# Patient Record
Sex: FEMALE | Race: WHITE | Employment: FULL TIME | URBAN - METROPOLITAN AREA
[De-identification: names, ages, dates, MRNs, and addresses within clinical notes are randomized per-mention and may not be internally consistent; named-entity substitution may affect disease eponyms.]

---

## 2017-02-02 ENCOUNTER — TELEPHONE (OUTPATIENT)
Dept: NURSING | Facility: CLINIC | Age: 42
End: 2017-02-02

## 2017-02-02 DIAGNOSIS — B37.9 YEAST INFECTION: Primary | ICD-10-CM

## 2017-02-02 RX ORDER — FLUCONAZOLE 150 MG/1
150 TABLET ORAL ONCE
Qty: 1 TABLET | Refills: 0 | Status: CANCELLED | OUTPATIENT
Start: 2017-02-02 | End: 2017-02-02

## 2017-02-02 NOTE — TELEPHONE ENCOUNTER
LM with information from CE's note below. Informed pt to call back to schedule appt or if has any further questions.

## 2017-02-02 NOTE — TELEPHONE ENCOUNTER
If monistat didn't help at all, chances are it not a yeast infection. It could be BV, so I do recommend she have a visit to check wet prep prior to Rx.     TREY Lin, PARonnieC

## 2017-02-02 NOTE — TELEPHONE ENCOUNTER
10/10/16 Annual. Pt has yeast infection. Symptoms include itchy and a little bit of discharge. Pt has had yeast infection for a couple of days,  did try monistat which did not help at all. Ing to [facility]EN[facility] to send diflucan RX.

## 2017-02-03 ENCOUNTER — OFFICE VISIT (OUTPATIENT)
Dept: OBGYN | Facility: CLINIC | Age: 42
End: 2017-02-03
Payer: COMMERCIAL

## 2017-02-03 VITALS
WEIGHT: 129 LBS | HEART RATE: 80 BPM | BODY MASS INDEX: 20.73 KG/M2 | HEIGHT: 66 IN | SYSTOLIC BLOOD PRESSURE: 114 MMHG | DIASTOLIC BLOOD PRESSURE: 76 MMHG

## 2017-02-03 DIAGNOSIS — N76.0 ACUTE VAGINITIS: Primary | ICD-10-CM

## 2017-02-03 LAB
MICRO REPORT STATUS: ABNORMAL
SPECIMEN SOURCE: ABNORMAL
WET PREP SPEC: ABNORMAL

## 2017-02-03 PROCEDURE — 87210 SMEAR WET MOUNT SALINE/INK: CPT | Performed by: PHYSICIAN ASSISTANT

## 2017-02-03 PROCEDURE — 99213 OFFICE O/P EST LOW 20 MIN: CPT | Performed by: PHYSICIAN ASSISTANT

## 2017-02-03 RX ORDER — FLUCONAZOLE 150 MG/1
150 TABLET ORAL ONCE
Qty: 2 TABLET | Refills: 0 | Status: SHIPPED | OUTPATIENT
Start: 2017-02-03 | End: 2017-02-03

## 2017-02-03 NOTE — PROGRESS NOTES
"    SUBJECTIVE:                                                   Anusha Robins is a 41 year old female who presents to clinic today for the following health issue(s):  Patient presents with:  Vaginal Problem: Here for possible yeast infection, itching on/off for couple months, tried Monistat last dose over last weekend  Amenorrhea: has not received period, stopped taking OCP 3 weeks ago       Additional information: has not restarted OCP because she has not received her period yet    HPI:  Vaginal itching off and on x 2 months. Initially took monistat OTC and helped a little, then symptoms returned and took the one day, no help at all that time. Mostly itching that feels like it is \"every where down there\". No discharge, odor. Is having some pelvic cramping. No fever, chills dysuria.     Did not have a period over placebo week this month, so has not started a new pill pack yet. Took a home pregnancy test and it was negative earlier this week. She is now 3 weeks without taking ocps.     Typically she does have every other month a very light period, almost spotting.     Patient's last menstrual period was 2017..   Patient is sexually active, .  Using oral contraceptives for contraception.    reports that she has quit smoking. She does not have any smokeless tobacco history on file.    STD testing offered?  Declined    Health maintenance updated:  yes    Today's PHQ-2 Score:   PHQ-2 (  [employer]) 2016   Q1: Little interest or pleasure in doing things 0   Q2: Feeling down, depressed or hopeless 0   PHQ-2 Score 0     Today's PHQ-9 Score:   PHQ-9 SCORE 10/10/2016   Total Score 3     Today's CASSANDRA-7 Score:   CASSANDRA-7 SCORE 10/10/2016   Total Score 4       Problem list and histories reviewed & adjusted, as indicated.  Additional history: as documented.    Patient Active Problem List   Diagnosis     Hypothyroidism, unspecified type     Past Surgical History   Procedure Laterality Date     No history of surgery  " "      Social History   Substance Use Topics     Smoking status: Former Smoker     Smokeless tobacco: Not on file     Alcohol Use: 0.0 oz/week     0 Standard drinks or equivalent per week      Problem (# of Occurrences) Relation (Name,Age of Onset)    DIABETES (2) Maternal Grandmother            Current Outpatient Prescriptions   Medication Sig     fluconazole (DIFLUCAN) 150 MG tablet Take 1 tablet (150 mg) by mouth once for 1 dose Repeat dose if needed after 4 days.     norethindrone-ethinyl estradiol (GILDESS 1.5/30) 1.5-30 MG-MCG per tablet Take 1 tablet by mouth daily     levothyroxine (SYNTHROID, LEVOTHROID) 137 MCG tablet Take 1 tablet (137 mcg) by mouth daily     Esomeprazole Magnesium (NEXIUM PO)      Probiotic Product (PROBIOTIC PO)      No current facility-administered medications for this visit.     Allergies   Allergen Reactions     Sulfa Drugs        ROS:  12 point review of systems negative other than symptoms noted below.  Constitutional: Fatigue  Genitourinary: No Periods, Vaginal Discharge and Vaginal Itching    OBJECTIVE:     /76 mmHg  Pulse 80  Ht 5' 6\" (1.676 m)  Wt 129 lb (58.514 kg)  BMI 20.83 kg/m2  LMP 01/01/2017  Breastfeeding? No  Body mass index is 20.83 kg/(m^2).    Exam:  Constitutional:  Appearance: Well nourished, well developed alert, in no acute distress  Neurologic/Psychiatric:  Mental Status:  Oriented X3   Pelvic Exam:  External Genitalia:     Normal appearance for age, no discharge present, no tenderness present, no inflammatory lesions present, color normal  Vagina:     Normal vaginal vault without central or paravaginal defects, no discharge present, no inflammatory lesions present, no masses present  Bladder:     Nontender to palpation  Urethra:   Urethral Body:  Urethra palpation normal, urethra structural support normal   Urethral Meatus:  No erythema or lesions present  Cervix:     Appearance healthy, no lesions present, nontender to palpation, no bleeding " present  Uterus:     Nontender to palpation, no masses present, position anteflexed, mobility: normal  Adnexa:     No adnexal tenderness present, no adnexal masses present  Perineum:     Perineum within normal limits, no evidence of trauma, no rashes or skin lesions present  Anus:     Anus within normal limits, no hemorrhoids present      In-Clinic Test Results:  Results for orders placed or performed in visit on 02/03/17 (from the past 24 hour(s))   Wet  Prep   Result Value Ref Range    Specimen Description Vagina     Wet Prep (A)      No Trichomonas seen  No clue cells seen  Yeast seen      Micro Report Status Pending        ASSESSMENT/PLAN:                                                        ICD-10-CM    1. Acute vaginitis N76.0 Wet  Prep     fluconazole (DIFLUCAN) 150 MG tablet         Wet prep positive for yeast infection, recommend treatment with diflucan x 2 if no improvement after 4 days of first tablet. Reviewed common etiologies, treatment and prevention of vaginitis.    Recommend she start new pack of ocps. Discussed that it can be normal to not have a period when on ocps for a long time. I recommend she stick with the pill pack if this happens again in the future. She agrees to plan (Home UPT was negative).       Saira Lucero PA-C  [facility] [place]

## 2017-02-03 NOTE — MR AVS SNAPSHOT
"              After Visit Summary   2/3/2017    Anusha Robins    MRN: [id number]           Patient Information     Date Of Birth          1975        Visit Information        Provider Department      2/3/2017 2:50 PM Saira Lucero PA-C [facility]        Today's Diagnoses     Acute vaginitis    -  1        Follow-ups after your visit        Follow-up notes from your care team     Return if symptoms worsen or fail to improve.      Who to contact     If you have questions or need follow up information about today's clinic visit or your schedule please contact [facility] directly at [phone].  Normal or non-critical lab and imaging results will be communicated to you by MyChart, letter or phone within 4 business days after the clinic has received the results. If you do not hear from us within 7 days, please contact the clinic through MyChart or phone. If you have a critical or abnormal lab result, we will notify you by phone as soon as possible.  Submit refill requests through Symcat or call your pharmacy and they will forward the refill request to us. Please allow 3 business days for your refill to be completed.          Additional Information About Your Visit        MyChart Information     Symcat lets you send messages to your doctor, view your test results, renew your prescriptions, schedule appointments and more. To sign up, go to [email or web address] . Click on \"Log in\" on the left side of the screen, which will take you to the Welcome page. Then click on \"Sign up Now\" on the right side of the page.     You will be asked to enter the access code listed below, as well as some personal information. Please follow the directions to create your username and password.     Your access code is: [email or web address]  Expires: 2017  3:54 PM     Your access code will  in 90 days. If you need help or a new code, please call your [place] clinic or " "[phone].        Care EveryWhere ID     This is your Care EveryWhere ID. This could be used by other organizations to access your [place] medical records  [id number]        Your Vitals Were     Pulse Height BMI (Body Mass Index) Last Period Breastfeeding?       80 5' 6\" (1.676 m) 20.83 kg/m2 01/01/2017 No        Blood Pressure from Last 3 Encounters:   02/03/17 114/76   10/10/16 118/72   08/06/15 130/70    Weight from Last 3 Encounters:   02/03/17 129 lb (58.514 kg)   10/10/16 132 lb (59.875 kg)   08/06/15 130 lb (58.968 kg)              We Performed the Following     Wet  Prep          Today's Medication Changes          These changes are accurate as of: 2/3/17  3:54 PM.  If you have any questions, ask your nurse or doctor.               Start taking these medicines.        Dose/Directions    fluconazole 150 MG tablet   Commonly known as:  DIFLUCAN   Used for:  Acute vaginitis   Started by:  Saira Lucero PA-C        Dose:  150 mg   Take 1 tablet (150 mg) by mouth once for 1 dose Repeat dose if needed after 4 days.   Quantity:  2 tablet   Refills:  0            Where to get your medicines      These medications were sent to [place] [address] [place] [address] [address]     Phone:  [phone]    - fluconazole 150 MG tablet             Primary Care Provider    None Specified       No primary provider on file.        Thank you!     Thank you for choosing [facility] [place]  for your care. Our goal is always to provide you with excellent care. Hearing back from our patients is one way we can continue to improve our services. Please take a few minutes to complete the written survey that you may receive in the mail after your visit with us. Thank you!             Your Updated Medication List - Protect others around you: Learn how to safely use, store and throw away your medicines at www.disposemymeds.org.          This list is accurate as " of: 2/3/17  3:54 PM.  Always use your most recent med list.                   Brand Name Dispense Instructions for use    fluconazole 150 MG tablet    DIFLUCAN    2 tablet    Take 1 tablet (150 mg) by mouth once for 1 dose Repeat dose if needed after 4 days.       levothyroxine 137 MCG tablet    SYNTHROID/LEVOTHROID    90 tablet    Take 1 tablet (137 mcg) by mouth daily       NEXIUM PO          norethindrone-ethinyl estradiol 1.5-30 MG-MCG per tablet    GILDESS 1.5/30    84 tablet    Take 1 tablet by mouth daily       PROBIOTIC PO

## 2017-08-17 ENCOUNTER — OFFICE VISIT (OUTPATIENT)
Dept: OBGYN | Facility: CLINIC | Age: 42
End: 2017-08-17
Payer: COMMERCIAL

## 2017-08-17 VITALS
BODY MASS INDEX: 21.38 KG/M2 | WEIGHT: 133 LBS | DIASTOLIC BLOOD PRESSURE: 78 MMHG | SYSTOLIC BLOOD PRESSURE: 102 MMHG | HEIGHT: 66 IN

## 2017-08-17 DIAGNOSIS — R30.0 DYSURIA: ICD-10-CM

## 2017-08-17 DIAGNOSIS — N30.00 ACUTE CYSTITIS WITHOUT HEMATURIA: Primary | ICD-10-CM

## 2017-08-17 LAB

## 2017-08-17 PROCEDURE — 99213 OFFICE O/P EST LOW 20 MIN: CPT | Performed by: OBSTETRICS & GYNECOLOGY

## 2017-08-17 PROCEDURE — 81001 URINALYSIS AUTO W/SCOPE: CPT | Performed by: OBSTETRICS & GYNECOLOGY

## 2017-08-17 RX ORDER — PHENAZOPYRIDINE HYDROCHLORIDE 200 MG/1
200 TABLET, FILM COATED ORAL 3 TIMES DAILY PRN
Qty: 6 TABLET | Refills: 0 | Status: SHIPPED | OUTPATIENT
Start: 2017-08-17 | End: 2017-08-19

## 2017-08-17 RX ORDER — NITROFURANTOIN 25; 75 MG/1; MG/1
100 CAPSULE ORAL 2 TIMES DAILY
Qty: 14 CAPSULE | Refills: 0 | Status: SHIPPED | OUTPATIENT
Start: 2017-08-17 | End: 2017-11-15

## 2017-08-17 NOTE — MR AVS SNAPSHOT
"              After Visit Summary   2017    Anusha Robins    MRN: [id number]           Patient Information     Date Of Birth          1975        Visit Information        Provider Department      2017 2:20 PM Arlin He,  [facility]        Today's Diagnoses     UTI (urinary tract infection)    -  1       Follow-ups after your visit        Who to contact     If you have questions or need follow up information about today's clinic visit or your schedule please contact [facility] directly at [phone].  Normal or non-critical lab and imaging results will be communicated to you by Patsnaphart, letter or phone within 4 business days after the clinic has received the results. If you do not hear from us within 7 days, please contact the clinic through ZIIBRAt or phone. If you have a critical or abnormal lab result, we will notify you by phone as soon as possible.  Submit refill requests through WebThriftStore or call your pharmacy and they will forward the refill request to us. Please allow 3 business days for your refill to be completed.          Additional Information About Your Visit        MyChart Information     WebThriftStore lets you send messages to your doctor, view your test results, renew your prescriptions, schedule appointments and more. To sign up, go to [email or web address] . Click on \"Log in\" on the left side of the screen, which will take you to the Welcome page. Then click on \"Sign up Now\" on the right side of the page.     You will be asked to enter the access code listed below, as well as some personal information. Please follow the directions to create your username and password.     Your access code is: [email or web address]  Expires: 11/15/2017  3:14 PM     Your access code will  in 90 days. If you need help or a new code, please call your [place] clinic or [phone].        Care EveryWhere ID     This is your Care EveryWhere ID. This could " "be used by other organizations to access your [place] medical records  [id number]        Your Vitals Were     Height Last Period Breastfeeding? BMI (Body Mass Index)          5' 6\" (1.676 m) 08/14/2017 (Exact Date) No 21.47 kg/m2         Blood Pressure from Last 3 Encounters:   08/17/17 102/78   02/03/17 114/76   10/10/16 118/72    Weight from Last 3 Encounters:   08/17/17 133 lb (60.3 kg)   02/03/17 129 lb (58.5 kg)   10/10/16 132 lb (59.9 kg)              We Performed the Following     UA with Microscopic        Primary Care Provider    None Specified       No primary provider on file.        Equal Access to Services     MAIRA GONZALES : Mao Blake, alexsandra laurent, seth gaviria, jonathon perez. So [facility] [phone].    ATENCIÓN: Si habla español, tiene a kelly disposición servicios gratuitos de asistencia lingüística. Llame al [phone].    We comply with applicable federal civil rights laws and [place] laws. We do not discriminate on the basis of race, color, national origin, age, disability sex, sexual orientation or gender identity.            Thank you!     Thank you for choosing [facility] [place]  for your care. Our goal is always to provide you with excellent care. Hearing back from our patients is one way we can continue to improve our services. Please take a few minutes to complete the written survey that you may receive in the mail after your visit with us. Thank you!             Your Updated Medication List - Protect others around you: Learn how to safely use, store and throw away your medicines at www.disposemymeds.org.          This list is accurate as of: 8/17/17  3:14 PM.  Always use your most recent med list.                   Brand Name Dispense Instructions for use Diagnosis    levothyroxine 137 MCG tablet    SYNTHROID/LEVOTHROID    90 tablet    Take 1 tablet (137 mcg) by mouth daily    Hypothyroidism, unspecified type    "    NEXIUM PO           norethindrone-ethinyl estradiol 1.5-30 MG-MCG per tablet    GILDESS 1.5/30    84 tablet    Take 1 tablet by mouth daily    Uses birth control       PROBIOTIC PO

## 2017-08-17 NOTE — PROGRESS NOTES
"    SUBJECTIVE:                                                   Anusha Robins is a 41 year old female who presents to clinic today for the following health issue(s):  Patient presents with:  UTI: On going painful urination and frequency for a couple days, patient states she cannot \"keep anything in\"       HPI:  Had HA couple days ago at the end of a work day. Started drinking a lot of water, normally drinks a lot anyway. Next day not feeling much better, but started having urinary frequency. No pain at that point. Then last night, feels some ttp in low back and urgency and frequency.   No fevers, maybe chills. No n/v. Poor appetite.       Patient's last menstrual period was 2017 (exact date)..   Patient is sexually active, .  Using oral contraceptives for contraception.    reports that she has quit smoking. She does not have any smokeless tobacco history on file.      STD testing offered?  Declined    Health maintenance updated:  yes    Today's PHQ-2 Score:   PHQ-2 (  [employer]) 2016   Q1: Little interest or pleasure in doing things 0   Q2: Feeling down, depressed or hopeless 0   PHQ-2 Score 0     Today's PHQ-9 Score:   PHQ-9 SCORE 10/10/2016   Total Score 3     Today's CASSANDRA-7 Score:   CASSANDRA-7 SCORE 10/10/2016   Total Score 4       Problem list and histories reviewed & adjusted, as indicated.  Additional history: as documented.    Patient Active Problem List   Diagnosis     Hypothyroidism, unspecified type     Past Surgical History:   Procedure Laterality Date     NO HISTORY OF SURGERY        Social History   Substance Use Topics     Smoking status: Former Smoker     Smokeless tobacco: Not on file     Alcohol use 0.0 oz/week     0 Standard drinks or equivalent per week      Problem (# of Occurrences) Relation (Name,Age of Onset)    DIABETES (2) Maternal Grandmother            Current Outpatient Prescriptions   Medication Sig     nitroFURantoin, macrocrystal-monohydrate, (MACROBID) 100 MG capsule " "Take 1 capsule (100 mg) by mouth 2 times daily     phenazopyridine (PYRIDIUM) 200 MG tablet Take 1 tablet (200 mg) by mouth 3 times daily as needed for irritation Expect your urine to appear bright orange     norethindrone-ethinyl estradiol (GILDESS 1.5/30) 1.5-30 MG-MCG per tablet Take 1 tablet by mouth daily     levothyroxine (SYNTHROID, LEVOTHROID) 137 MCG tablet Take 1 tablet (137 mcg) by mouth daily     Esomeprazole Magnesium (NEXIUM PO)      Probiotic Product (PROBIOTIC PO)      No current facility-administered medications for this visit.      Allergies   Allergen Reactions     Sulfa Drugs        ROS:  12 point review of systems negative other than symptoms noted below.  Gastrointestinal: Back Pain  Genitourinary: Frequency, Night Sweats, Painful Urination and Urgency    OBJECTIVE:     /78  Ht 5' 6\" (1.676 m)  Wt 133 lb (60.3 kg)  LMP 08/14/2017 (Exact Date)  Breastfeeding? No  BMI 21.47 kg/m2  Body mass index is 21.47 kg/(m^2).    Exam:  Constitutional:  Appearance: Well nourished, well developed alert, in no acute distress  Neurologic/Psychiatric:  Mental Status:  Oriented X3      In-Clinic Test Results:  Results for orders placed or performed in visit on 08/17/17 (from the past 24 hour(s))   UA with Microscopic   Result Value Ref Range    Color Urine Yellow     Appearance Urine Clear     Glucose Urine Negative NEG^Negative mg/dL    Bilirubin Urine Negative NEG^Negative    Ketones Urine Negative NEG^Negative mg/dL    Specific Gravity Urine 1.010 1.003 - 1.035    pH Urine 7.0 5.0 - 7.0 pH    Protein Albumin Urine Negative NEG^Negative mg/dL    Urobilinogen Urine 0.2 0.2 - 1.0 EU/dL    Nitrite Urine Negative NEG^Negative    Blood Urine Trace (A) NEG^Negative    Leukocyte Esterase Urine 1+ (A) NEG^Negative    Source Midstream Urine     WBC Urine 2-5 (A) OTO2^O - 2 /HPF    RBC Urine O - 2 OTO2^O - 2 /HPF    Squamous Epithelial /LPF Urine Few FEW^Few /LPF    Bacteria Urine Few (A) NEG^Negative /HPF "       ASSESSMENT/PLAN:                                                        ICD-10-CM    1. Dysuria R30.0 nitroFURantoin, macrocrystal-monohydrate, (MACROBID) 100 MG capsule     phenazopyridine (PYRIDIUM) 200 MG tablet   2. Acute cystitis without hematuria N30.00          -Reviewed UA results. Based on this and history, will empirically tx for UTI with macrobid, pain control with pyridium and NSAIDs prn. If symptoms worsening, new symptoms such as fever develop or not improving, call to clinic, even if weekend.    Arlin Dotson Masters, [facility]

## 2017-10-06 DIAGNOSIS — E03.9 HYPOTHYROIDISM, UNSPECIFIED TYPE: ICD-10-CM

## 2017-10-06 RX ORDER — LEVOTHYROXINE SODIUM 137 UG/1
TABLET ORAL
Qty: 30 TABLET | Refills: 0 | Status: SHIPPED | OUTPATIENT
Start: 2017-10-06 | End: 2017-11-10

## 2017-10-06 NOTE — TELEPHONE ENCOUNTER
Levothyroxine 137mcg      Last Written Prescription Date:  10/10/16  Last Fill Quantity: 90,   # refills: 3  Last Office Visit with [facility] primary care provider:  10/10/16  Future Office visit: none    Per [facility] protocol will send one month supply only- Pt is due for annual exam

## 2017-11-07 ENCOUNTER — HOSPITAL ENCOUNTER (OUTPATIENT)
Dept: MAMMOGRAPHY | Facility: CLINIC | Age: 42
Discharge: HOME | End: 2017-11-07
Attending: PHYSICIAN ASSISTANT | Admitting: PHYSICIAN ASSISTANT
Payer: COMMERCIAL

## 2017-11-07 DIAGNOSIS — Z12.31 VISIT FOR SCREENING MAMMOGRAM: ICD-10-CM

## 2017-11-07 PROCEDURE — G0202 SCR MAMMO BI INCL CAD: HCPCS

## 2017-11-09 DIAGNOSIS — E03.9 HYPOTHYROIDISM, UNSPECIFIED TYPE: ICD-10-CM

## 2017-11-09 RX ORDER — LEVOTHYROXINE SODIUM 137 UG/1
TABLET ORAL
Qty: 30 TABLET | Refills: 0 | OUTPATIENT
Start: 2017-11-09

## 2017-11-09 NOTE — TELEPHONE ENCOUNTER
levothyroxine (SYNTHROID/LEVOTHROID) 137 MCG tablet      Last Written Prescription Date:  10/6/17  Last Fill Quantity: 30,   # refills: 0  Last Office Visit with [facility] [place] primary care provider:  10/10/16  Future Office visit: none    Routing refill request to provider for review/approval because:  Refill denied. Pt overdue for annual and labs.

## 2017-11-10 DIAGNOSIS — E03.9 HYPOTHYROIDISM, UNSPECIFIED TYPE: ICD-10-CM

## 2017-11-10 RX ORDER — LEVOTHYROXINE SODIUM 137 UG/1
TABLET ORAL
Qty: 90 TABLET | Refills: 0 | Status: SHIPPED | OUTPATIENT
Start: 2017-11-10 | End: 2017-11-15

## 2017-11-10 NOTE — TELEPHONE ENCOUNTER
Levothyroxine 137mcg      Last Written Prescription Date:  10/6/17  Last Fill Quantity: 30,   # refills: 0  Last Office Visit with [facility] primary care provider:  10/10/16  Future Office visit: 11/15/17    Pt due for annual, appt scheduled,3 month supply sent for insurance purposes.

## 2017-11-15 ENCOUNTER — OFFICE VISIT (OUTPATIENT)
Dept: OBGYN | Facility: CLINIC | Age: 42
End: 2017-11-15
Payer: COMMERCIAL

## 2017-11-15 VITALS
WEIGHT: 133.8 LBS | BODY MASS INDEX: 21.5 KG/M2 | DIASTOLIC BLOOD PRESSURE: 70 MMHG | HEART RATE: 84 BPM | HEIGHT: 66 IN | SYSTOLIC BLOOD PRESSURE: 100 MMHG

## 2017-11-15 DIAGNOSIS — Z13.6 SCREENING FOR ISCHEMIC HEART DISEASE: ICD-10-CM

## 2017-11-15 DIAGNOSIS — E03.9 HYPOTHYROIDISM, UNSPECIFIED TYPE: ICD-10-CM

## 2017-11-15 DIAGNOSIS — Z78.9 USES BIRTH CONTROL: ICD-10-CM

## 2017-11-15 DIAGNOSIS — Z01.419 ENCOUNTER FOR GYNECOLOGICAL EXAMINATION WITHOUT ABNORMAL FINDING: Primary | ICD-10-CM

## 2017-11-15 LAB
CHOLEST SERPL-MCNC: 146 MG/DL
HDLC SERPL-MCNC: 97 MG/DL
LDLC SERPL CALC-MCNC: 39 MG/DL
NONHDLC SERPL-MCNC: 49 MG/DL
TRIGL SERPL-MCNC: 52 MG/DL
TSH SERPL DL<=0.005 MIU/L-ACNC: 0.5 MU/L (ref 0.4–4)

## 2017-11-15 PROCEDURE — 99396 PREV VISIT EST AGE 40-64: CPT | Performed by: NURSE PRACTITIONER

## 2017-11-15 PROCEDURE — 87624 HPV HI-RISK TYP POOLED RSLT: CPT | Performed by: NURSE PRACTITIONER

## 2017-11-15 PROCEDURE — 80061 LIPID PANEL: CPT | Performed by: NURSE PRACTITIONER

## 2017-11-15 PROCEDURE — 84443 ASSAY THYROID STIM HORMONE: CPT | Performed by: NURSE PRACTITIONER

## 2017-11-15 PROCEDURE — 36415 COLL VENOUS BLD VENIPUNCTURE: CPT | Performed by: NURSE PRACTITIONER

## 2017-11-15 PROCEDURE — G0145 SCR C/V CYTO,THINLAYER,RESCR: HCPCS | Performed by: NURSE PRACTITIONER

## 2017-11-15 RX ORDER — NORETHINDRONE ACETATE AND ETHINYL ESTRADIOL .03; 1.5 MG/1; MG/1
1 TABLET ORAL DAILY
Qty: 84 TABLET | Refills: 4 | Status: SHIPPED | OUTPATIENT
Start: 2017-11-15 | End: 2019-02-04

## 2017-11-15 RX ORDER — LEVOTHYROXINE SODIUM 137 UG/1
137 TABLET ORAL DAILY
Qty: 90 TABLET | Refills: 3 | Status: SHIPPED | OUTPATIENT
Start: 2017-11-15 | End: 2018-11-20

## 2017-11-15 ASSESSMENT — PATIENT HEALTH QUESTIONNAIRE - PHQ9
SUM OF ALL RESPONSES TO PHQ QUESTIONS 1-9: 4
5. POOR APPETITE OR OVEREATING: SEVERAL DAYS

## 2017-11-15 ASSESSMENT — ANXIETY QUESTIONNAIRES
7. FEELING AFRAID AS IF SOMETHING AWFUL MIGHT HAPPEN: SEVERAL DAYS
3. WORRYING TOO MUCH ABOUT DIFFERENT THINGS: SEVERAL DAYS
IF YOU CHECKED OFF ANY PROBLEMS ON THIS QUESTIONNAIRE, HOW DIFFICULT HAVE THESE PROBLEMS MADE IT FOR YOU TO DO YOUR WORK, TAKE CARE OF THINGS AT HOME, OR GET ALONG WITH OTHER PEOPLE: NOT DIFFICULT AT ALL
2. NOT BEING ABLE TO STOP OR CONTROL WORRYING: SEVERAL DAYS
5. BEING SO RESTLESS THAT IT IS HARD TO SIT STILL: NOT AT ALL
6. BECOMING EASILY ANNOYED OR IRRITABLE: SEVERAL DAYS
GAD7 TOTAL SCORE: 6
1. FEELING NERVOUS, ANXIOUS, OR ON EDGE: SEVERAL DAYS

## 2017-11-15 NOTE — LETTER
11/16/2017     Anusha Jacksonozelfego  [address] [place] [phone]        Anusha Robins your lab results came back normal.      Results for orders placed or performed in visit on 11/15/17   Lipid Profile   Result Value Ref Range    Cholesterol 146 <200 mg/dL    Triglycerides 52 <150 mg/dL    HDL Cholesterol 97 >49 mg/dL    LDL Cholesterol Calculated 39 <100 mg/dL    Non HDL Cholesterol 49 <130 mg/dL   TSH   Result Value Ref Range    TSH 0.50 0.40 - 4.00 mU/L       Please call if any questions.    Cordially,    ALICIA Rudolph CNP

## 2017-11-15 NOTE — LETTER
November 26, 2017    Anusha HERNANDEZ Julienne  [address] [place] [phone]    Dear Anusha,  We are happy to inform you that your PAP smear result from 11/15/17 is normal.  We are now able to do a follow up test on PAP smears. The DNA test is for HPV (Human Papilloma Virus). Cervical cancer is closely linked with certain types of HPV. Your result showed no evidence of high risk HPV.  Therefore we recommend you return in 1 year for your next pap smear.  You will still need to return to the clinic every year for an annual exam and other preventive tests.  Please contact the clinic at [phone] with any questions.  Sincerely,    ALICIA Rudolph CNP/rlm

## 2017-11-15 NOTE — MR AVS SNAPSHOT
"              After Visit Summary   11/15/2017    Anusha Robins    MRN: [id number]           Patient Information     Date Of Birth          1975        Visit Information        Provider Department      11/15/2017 9:30 AM La Lua APRN CNP [facility]        Today's Diagnoses     Encounter for gynecological examination without abnormal finding    -  1    Uses birth control        Hypothyroidism, unspecified type        Screening for ischemic heart disease           Follow-ups after your visit        Who to contact     If you have questions or need follow up information about today's clinic visit or your schedule please contact [facility] directly at [phone].  Normal or non-critical lab and imaging results will be communicated to you by MyChart, letter or phone within 4 business days after the clinic has received the results. If you do not hear from us within 7 days, please contact the clinic through [employer] or phone. If you have a critical or abnormal lab result, we will notify you by phone as soon as possible.  Submit refill requests through [employer] or call your pharmacy and they will forward the refill request to us. Please allow 3 business days for your refill to be completed.          Additional Information About Your Visit        MyChart Information     [employer] lets you send messages to your doctor, view your test results, renew your prescriptions, schedule appointments and more. To sign up, go to [email or web address] . Click on \"Log in\" on the left side of the screen, which will take you to the Welcome page. Then click on \"Sign up Now\" on the right side of the page.     You will be asked to enter the access code listed below, as well as some personal information. Please follow the directions to create your username and password.     Your access code is: [email or web address]  Expires: 11/15/2017  2:14 PM     Your access code will  in 90 days. If " "you need help or a new code, please call your [facility] clinic or [phone].        Care EveryWhere ID     This is your Care EveryWhere ID. This could be used by other organizations to access your [facility] medical records  [id number]        Your Vitals Were     Pulse Height Last Period BMI (Body Mass Index)          84 5' 6\" (1.676 m) 10/29/2017 (Approximate) 21.6 kg/m2         Blood Pressure from Last 3 Encounters:   11/15/17 100/70   08/17/17 102/78   02/03/17 114/76    Weight from Last 3 Encounters:   11/15/17 133 lb 12.8 oz (60.7 kg)   08/17/17 133 lb (60.3 kg)   02/03/17 129 lb (58.5 kg)              We Performed the Following     HPV High Risk Types DNA Cervical     Lipid Profile     Pap imaged thin layer screen with HPV - recommended age 30 - 65     TSH          Today's Medication Changes          These changes are accurate as of: 11/15/17  9:58 AM.  If you have any questions, ask your nurse or doctor.               These medicines have changed or have updated prescriptions.        Dose/Directions    levothyroxine 137 MCG tablet   Commonly known as:  SYNTHROID/LEVOTHROID   This may have changed:  See the new instructions.   Used for:  Hypothyroidism, unspecified type   Changed by:  La Lua APRN CNP        Dose:  137 mcg   Take 1 tablet (137 mcg) by mouth daily   Quantity:  90 tablet   Refills:  3            Where to get your medicines      These medications were sent to [facility] [id number] [place] [id number] [address]     Phone:  [phone]     levothyroxine 137 MCG tablet    norethindrone-ethinyl estradiol 1.5-30 MG-MCG per tablet                Primary Care Provider    [facility]       No address on file        Equal Access to Services     MAIRA GONZALES AH: Mao Blake, waaxda ludelicia, qaybta kaalmada khadra, jonathon perez. So [facility] [phone].    ATENCIÓN: Si habla " español, tiene a kelly disposición servicios gratuitos de asistencia lingüística. Gerardo kumar [phone].    We comply with applicable federal civil rights laws and [place] laws. We do not discriminate on the basis of race, color, national origin, age, disability, sex, sexual orientation, or gender identity.            Thank you!     Thank you for choosing [facility]  for your care. Our goal is always to provide you with excellent care. Hearing back from our patients is one way we can continue to improve our services. Please take a few minutes to complete the written survey that you may receive in the mail after your visit with us. Thank you!             Your Updated Medication List - Protect others around you: Learn how to safely use, store and throw away your medicines at www.disposemymeds.org.          This list is accurate as of: 11/15/17  9:58 AM.  Always use your most recent med list.                   Brand Name Dispense Instructions for use Diagnosis    levothyroxine 137 MCG tablet    SYNTHROID/LEVOTHROID    90 tablet    Take 1 tablet (137 mcg) by mouth daily    Hypothyroidism, unspecified type       NEXIUM PO           norethindrone-ethinyl estradiol 1.5-30 MG-MCG per tablet    GILDESS 1.5/30    84 tablet    Take 1 tablet by mouth daily    Uses birth control       PROBIOTIC PO

## 2017-11-15 NOTE — PROGRESS NOTES
Anusha is a 42 year old  female who presents for annual exam.     Besides routine health maintenance, she has no other health concerns today .    HPI: here for annual exam.  Tried going off OCP's for a few months, but was very mckoy, so went back on and doing better.  Cycles are light.  Kids are 10 and 12        GYNECOLOGIC HISTORY:    Patient's last menstrual period was 10/29/2017 (approximate).  Her current contraception method is: oral contraceptives.  She  reports that she has quit smoking. She has never used smokeless tobacco.      Patient is sexually active.  STD testing offered?  Declined  Last PHQ-9 score on record =   PHQ-9 SCORE 11/15/2017   Total Score 4     Last GAD7 score on record =   CASSANDRA-7 SCORE 11/15/2017   Total Score 6     Alcohol Score = 3    HEALTH MAINTENANCE:  Cholesterol:   Last Mammo: 2017, Result: normal, Next Mammo: 2018  Pap: 2012 Neg, Neg HPV  Colonoscopy:  Never had done, Result: not applicable, Next Colonoscopy: at age 49.  Dexa:  Never had done.    Health maintenance updated:  yes    HISTORY:  Obstetric History       T2      L2     SAB0   TAB0   Ectopic0   Multiple0   Live Births2       # Outcome Date GA Lbr Cesar/2nd Weight Sex Delivery Anes PTL Lv   2 Term 07 39w0d  6 lb (2.722 kg) F  EPI  MAURICE      Name: Buffy   1 Term 05 40w0d  6 lb 12 oz (3.062 kg) M  EPI  MAURICE      Name: Ryan          Patient Active Problem List   Diagnosis     Hypothyroidism, unspecified type     Past Surgical History:   Procedure Laterality Date     NO HISTORY OF SURGERY        Social History   Substance Use Topics     Smoking status: Former Smoker     Smokeless tobacco: Never Used      Comment: quit      Alcohol use 0.0 oz/week     0 Standard drinks or equivalent per week      Problem (# of Occurrences) Relation (Name,Age of Onset)    DIABETES (2) Maternal Grandmother, Other: Aunt, unspecified            Current Outpatient Prescriptions   Medication  "Sig     norethindrone-ethinyl estradiol (GILDESS 1.5/30) 1.5-30 MG-MCG per tablet Take 1 tablet by mouth daily     levothyroxine (SYNTHROID/LEVOTHROID) 137 MCG tablet Take 1 tablet (137 mcg) by mouth daily     Esomeprazole Magnesium (NEXIUM PO)      Probiotic Product (PROBIOTIC PO)      [DISCONTINUED] levothyroxine (SYNTHROID/LEVOTHROID) 137 MCG tablet TAKE 1 TABLET (137 MCG) BY MOUTH DAILY     [DISCONTINUED] norethindrone-ethinyl estradiol (GILDESS 1.5/30) 1.5-30 MG-MCG per tablet Take 1 tablet by mouth daily     No current facility-administered medications for this visit.      Allergies   Allergen Reactions     Sulfa Drugs        Past medical, surgical, social and family histories were reviewed and updated in EPIC.    ROS:   12 point review of systems negative other than symptoms noted below.  Head: Nasal Congestion  Cardiovascular: Palpitations  Genitourinary: Cramps  Musculoskeletal: Joint Pain  Endocrine: Cold Intolerance  Psychiatric: Anxiety    EXAM:  /70 (Patient Position: Chair, Cuff Size: Adult Regular)  Pulse 84  Ht 5' 6\" (1.676 m)  Wt 133 lb 12.8 oz (60.7 kg)  LMP 10/29/2017 (Approximate)  BMI 21.6 kg/m2   BMI: Body mass index is 21.6 kg/(m^2).    PHYSICAL EXAM:  Constitutional:  Appearance: Well nourished, well developed, alert, in no acute distress  Neck:  Lymph Nodes:  No lymphadenopathy present    Thyroid:  Gland size normal, nontender, no nodules or masses present  on palpation  Chest:  Respiratory Effort:  Breathing unlabored  Cardiovascular:    Heart: Auscultation:  Regular rate, normal rhythm, no murmurs present  Breasts: Inspection of Breasts:  No lymphadenopathy present., Palpation of Breasts and Axillae:  No masses present on palpation, no breast tenderness., Axillary Lymph Nodes:  No lymphadenopathy present. and No nodularity, asymmetry or nipple discharge bilaterally.  Gastrointestinal:   Abdominal Examination:  Abdomen nontender to palpation, tone normal without rigidity or " guarding, no masses present, umbilicus without lesions   Liver and Spleen:  No hepatomegaly present, liver nontender to palpation    Hernias:  No hernias present  Lymphatic: Lymph Nodes:  No other lymphadenopathy present  Skin:  General Inspection:  No rashes present, no lesions present, no areas of  discoloration    Genitalia and Groin:  No rashes present, no lesions present, no areas of  discoloration, no masses present  Neurologic/Psychiatric:    Mental Status:  Oriented X3     Pelvic Exam:  External Genitalia:     Normal appearance for age, no discharge present, no tenderness present, no inflammatory lesions present, color normal  Vagina:     Normal vaginal vault without central or paravaginal defects, no discharge present, no inflammatory lesions present, no masses present  Bladder:     Nontender to palpation  Urethra:   Urethral Body:  Urethra palpation normal, urethra structural support normal   Urethral Meatus:  No erythema or lesions present  Cervix:     Appearance healthy, no lesions present, nontender to palpation, no bleeding present  Uterus:     Uterus: firm, normal sized and nontender, retroverted in position.   Adnexa:     No adnexal tenderness present, no adnexal masses present  Perineum:     Perineum within normal limits, no evidence of trauma, no rashes or skin lesions present  Anus:     Anus within normal limits, no hemorrhoids present  Inguinal Lymph Nodes:     No lymphadenopathy present  Pubic Hair:     Normal pubic hair distribution for age  Genitalia and Groin:     No rashes present, no lesions present, no areas of discoloration, no masses present      COUNSELING:   Reviewed preventive health counseling, as reflected in patient instructions       Regular exercise       Healthy diet/nutrition       Contraception    BMI: Body mass index is 21.6 kg/(m^2).        ASSESSMENT:  42 year old female with satisfactory annual exam.    ICD-10-CM    1. Encounter for gynecological examination without  abnormal finding Z01.419 Pap imaged thin layer screen with HPV - recommended age 30 - 65     HPV High Risk Types DNA Cervical   2. Uses birth control Z30.9 norethindrone-ethinyl estradiol (GILDESS 1.5/30) 1.5-30 MG-MCG per tablet   3. Hypothyroidism, unspecified type E03.9 levothyroxine (SYNTHROID/LEVOTHROID) 137 MCG tablet     TSH   4. Screening for ischemic heart disease Z13.6 Lipid Profile       PLAN:  Normal Gyn exam.  Ok to continue OC's for 1 year.  Will notify patient with lab work when available.  Return prn or 1 year for annual and pap smear.    ALICIA Rudolph CNP

## 2017-11-16 ASSESSMENT — ANXIETY QUESTIONNAIRES: GAD7 TOTAL SCORE: 6

## 2017-11-17 LAB
COPATH REPORT: NORMAL
PAP: NORMAL

## 2017-11-21 LAB
FINAL DIAGNOSIS: NORMAL
HPV HR 12 DNA CVX QL NAA+PROBE: NEGATIVE
HPV16 DNA SPEC QL NAA+PROBE: NEGATIVE
HPV18 DNA SPEC QL NAA+PROBE: NEGATIVE
SPECIMEN DESCRIPTION: NORMAL

## 2017-11-28 ENCOUNTER — TELEPHONE (OUTPATIENT)
Dept: OBGYN | Facility: CLINIC | Age: 42
End: 2017-11-28

## 2017-11-28 DIAGNOSIS — R30.0 DYSURIA: ICD-10-CM

## 2017-11-28 DIAGNOSIS — R30.0 DYSURIA: Primary | ICD-10-CM

## 2017-11-28 LAB
ALBUMIN UR-MCNC: NEGATIVE MG/DL
APPEARANCE UR: CLEAR
BILIRUB UR QL STRIP: NEGATIVE
COLOR UR AUTO: YELLOW
GLUCOSE UR STRIP-MCNC: NEGATIVE MG/DL
HGB UR QL STRIP: ABNORMAL
KETONES UR STRIP-MCNC: ABNORMAL MG/DL
LEUKOCYTE ESTERASE UR QL STRIP: NEGATIVE
NITRATE UR QL: NEGATIVE
PH UR STRIP: 6 PH (ref 5–7)
RBC #/AREA URNS AUTO: NORMAL /HPF
SOURCE: ABNORMAL
SP GR UR STRIP: 1.01 (ref 1–1.03)
UROBILINOGEN UR STRIP-ACNC: 0.2 EU/DL (ref 0.2–1)
WBC #/AREA URNS AUTO: NORMAL /HPF

## 2017-11-28 PROCEDURE — 81001 URINALYSIS AUTO W/SCOPE: CPT | Performed by: OBSTETRICS & GYNECOLOGY

## 2017-11-28 PROCEDURE — 87086 URINE CULTURE/COLONY COUNT: CPT | Performed by: OBSTETRICS & GYNECOLOGY

## 2017-11-28 NOTE — TELEPHONE ENCOUNTER
"POC note 8/17/17: \"-Reviewed UA results. Based on this and history, will empirically tx for UTI with macrobid, pain control with pyridium and NSAIDs prn. If symptoms worsening, new symptoms such as fever develop or not improving, call to clinic, even if weekend.\"    Symptoms started on Sunday- and states she gets these twice a year same exact symptoms as previous UTIs- urgency, back pain that radiates up into shoulders, frequency but not much urine, pt denies blood in urine. Pt states because she gets them so often she does not want to/feel the need to come in to see a provider, would be ok with leaving a UA/UC. Routing to on call Dr. Bell to review and advise- ok for pt to come in to leave a urine sample?   "

## 2017-11-28 NOTE — TELEPHONE ENCOUNTER
Has UTI symptoms, has been seen in the past for this -- can she get a script    Pharmacy - [facility] in [place]

## 2017-11-29 ENCOUNTER — TELEPHONE (OUTPATIENT)
Dept: NURSING | Facility: CLINIC | Age: 42
End: 2017-11-29

## 2017-11-30 LAB
BACTERIA SPEC CULT: NO GROWTH
Lab: NORMAL
SPECIMEN SOURCE: NORMAL

## 2017-12-18 DIAGNOSIS — Z78.9 USES BIRTH CONTROL: ICD-10-CM

## 2017-12-19 RX ORDER — NORETHINDRONE ACETATE AND ETHINYL ESTRADIOL 1.5; 3 MG/1; UG/1
TABLET ORAL
Qty: 84 TABLET | Refills: 3 | OUTPATIENT
Start: 2017-12-19

## 2017-12-19 NOTE — TELEPHONE ENCOUNTER
Junel 1.5/30      Last Written Prescription Date:  11/15/17  Last Fill Quantity: 84 tabs,   # refills: 4  [facility] Office Visit with [facility] care provider:  11/15/17-Annual with ANA Bowen  Future Office visit: None      Called pt to see if she is taking Rx continuously or not as the Rx she was prescribed is not the Fe version and therefore a 3 month supply would be 63 tabs. Pt states she gets a period every month. Rx denied, has refills at pharmacy. Called pharmacy and spoke with Pharmacist (Zee) and she noted because the Rx does not note continuously it will automatically be billed 63 tabs for a 84 day supply and not dispensed 84 tabs (the Fe version). Updated medication profile.        Closing encounter.

## 2018-11-20 ENCOUNTER — TELEPHONE (OUTPATIENT)
Dept: OBGYN | Facility: CLINIC | Age: 43
End: 2018-11-20

## 2018-11-20 DIAGNOSIS — E03.9 HYPOTHYROIDISM, UNSPECIFIED TYPE: ICD-10-CM

## 2018-11-20 RX ORDER — LEVOTHYROXINE SODIUM 137 UG/1
137 TABLET ORAL DAILY
Qty: 30 TABLET | Refills: 0 | Status: SHIPPED | OUTPATIENT
Start: 2018-11-20 | End: 2020-01-03

## 2018-11-20 NOTE — TELEPHONE ENCOUNTER
levothyroxine (SYNTHROID/LEVOTHROID) 137 MCG tablet   Last Written Prescription Date:  11/15/17  Last Fill Quantity: 90,   # refills: 3  Last Office Visit with G primary care provider:  11/15/17  Future Office visit: none    Routing refill request to provider for review/approval because:  Refill sent for 30 days. Pt due for annual.

## 2018-11-20 NOTE — TELEPHONE ENCOUNTER
Patient needs refill on Levothyroxine, states [facility] in [place] requested refill a few weeks ago.

## 2019-02-04 DIAGNOSIS — Z78.9 USES BIRTH CONTROL: ICD-10-CM

## 2019-02-04 RX ORDER — NORETHINDRONE ACETATE AND ETHINYL ESTRADIOL .03; 1.5 MG/1; MG/1
1 TABLET ORAL DAILY
Qty: 28 TABLET | Refills: 0 | Status: ON HOLD | OUTPATIENT
Start: 2019-02-04 | End: 2022-02-25

## 2019-02-04 NOTE — TELEPHONE ENCOUNTER
"Requested Prescriptions   Pending Prescriptions Disp Refills     norethindrone-ethinyl estradiol (GILDESS 1.5/30) 1.5-30 MG-MCG tablet 84 tablet 4     Sig: Take 1 tablet by mouth daily    Contraceptives Protocol Failed - 2/4/2019  4:33 PM       Failed - Recent (12 mo) or future (30 days) visit within the authorizing provider's specialty    Patient had office visit in the last 12 months or has a visit in the next 30 days with authorizing provider or within the authorizing provider's specialty.  See \"Patient Info\" tab in inbasket, or \"Choose Columns\" in Meds & Orders section of the refill encounter.             Passed - Patient is not a current smoker if age is 35 or older       Passed - Medication is active on med list       Passed - No active pregnancy on record       Passed - No positive pregnancy test in past 12 months        Last Written Prescription Date:  11/15/17  Last Fill Quantity: 84,  # refills: 4   Last office visit: 11/15/2017 with prescribing provider:  La Lua   Future Office Visit:  None, due for annual    Medication is being filled for 1 time refill only due to:  pt needs an appointment for an annual exam for further refills   Susanne Soares RN on 2/4/2019 at 4:57 PM      "

## 2019-02-20 DIAGNOSIS — Z78.9 USES BIRTH CONTROL: ICD-10-CM

## 2019-02-20 RX ORDER — NORETHINDRONE ACETATE AND ETHINYL ESTRADIOL .03; 1.5 MG/1; MG/1
1 TABLET ORAL DAILY
Qty: 28 TABLET | Refills: 0 | OUTPATIENT
Start: 2019-02-20

## 2019-02-20 NOTE — TELEPHONE ENCOUNTER
"Requested Prescriptions   Pending Prescriptions Disp Refills     norethindrone-ethinyl estradiol (GILDESS 1.5/30) 1.5-30 MG-MCG tablet 28 tablet 0     Sig: Take 1 tablet by mouth daily    Contraceptives Protocol Failed - 2/20/2019  2:18 PM       Failed - Recent (12 mo) or future (30 days) visit within the authorizing provider's specialty    Patient had office visit in the last 12 months or has a visit in the next 30 days with authorizing provider or within the authorizing provider's specialty.  See \"Patient Info\" tab in inbasket, or \"Choose Columns\" in Meds & Orders section of the refill encounter.             Passed - Patient is not a current smoker if age is 35 or older       Passed - Medication is active on med list       Passed - No active pregnancy on record       Passed - No positive pregnancy test in past 12 months        Last Written Prescription Date:  2/4/19  Last Fill Quantity: 28,  # refills: 0   Last office visit: 11/15/2017 with prescribing provider:  Jessi Lua   Future Office Visit:  none    "

## 2019-02-20 NOTE — TELEPHONE ENCOUNTER
Pt has not been seen in clinic since 11/29/2017  Refill extension already granted x1  Refill request refused. Message sent through to pharmacy

## 2019-03-05 DIAGNOSIS — Z78.9 USES BIRTH CONTROL: ICD-10-CM

## 2019-03-05 RX ORDER — NORETHINDRONE ACETATE AND ETHINYL ESTRADIOL .03; 1.5 MG/1; MG/1
1 TABLET ORAL DAILY
Qty: 28 TABLET | Refills: 0 | OUTPATIENT
Start: 2019-03-05

## 2019-03-05 NOTE — TELEPHONE ENCOUNTER
"Requested Prescriptions   Pending Prescriptions Disp Refills     norethindrone-ethinyl estradiol (GILDESS 1.5/30) 1.5-30 MG-MCG tablet 28 tablet 0     Sig: Take 1 tablet by mouth daily    Contraceptives Protocol Failed - 3/5/2019 11:34 AM       Failed - Recent (12 mo) or future (30 days) visit within the authorizing provider's specialty    Patient had office visit in the last 12 months or has a visit in the next 30 days with authorizing provider or within the authorizing provider's specialty.  See \"Patient Info\" tab in inbasket, or \"Choose Columns\" in Meds & Orders section of the refill encounter.             Passed - Patient is not a current smoker if age is 35 or older       Passed - Medication is active on med list       Passed - No active pregnancy on record       Passed - No positive pregnancy test in past 12 months        Last Written Prescription Date:  2/4/19  Last Fill Quantity: 28,  # refills: 0   Last office visit: 11/15/2017 with prescribing provider:  Jessi Lua   Future Office Visit:  None    Pt due for annual, no appt scheduled. Pt already received one month extension. Rx denied.   Susanne Soares RN on 3/5/2019 at 11:40 AM      "

## 2020-01-03 ENCOUNTER — TELEPHONE (OUTPATIENT)
Dept: OBGYN | Facility: CLINIC | Age: 45
End: 2020-01-03

## 2020-01-03 DIAGNOSIS — E03.9 HYPOTHYROIDISM, UNSPECIFIED TYPE: ICD-10-CM

## 2020-01-03 RX ORDER — LEVOTHYROXINE SODIUM 137 UG/1
TABLET ORAL
Qty: 90 TABLET | Refills: 3 | OUTPATIENT
Start: 2020-01-03

## 2020-01-03 RX ORDER — LEVOTHYROXINE SODIUM 137 UG/1
137 TABLET ORAL DAILY
Qty: 30 TABLET | Refills: 0 | Status: SHIPPED | OUTPATIENT
Start: 2020-01-03 | End: 2020-01-09

## 2020-01-03 NOTE — TELEPHONE ENCOUNTER
Requested Prescriptions   Pending Prescriptions Disp Refills     levothyroxine (SYNTHROID/LEVOTHROID) 137 MCG tablet 30 tablet 0     Sig: Take 1 tablet (137 mcg) by mouth daily       There is no refill protocol information for this order        Susanne Soares RN on 1/3/2020 at 10:04 AM

## 2020-01-03 NOTE — TELEPHONE ENCOUNTER
Pt has apt scheduled with MICHAEL Lua NP 1/9/20  Requesting refill be sent for levothyroxine  Refill sent per MICHAEL Pascal RN on 1/3/2020 at 10:04 AM

## 2020-01-03 NOTE — TELEPHONE ENCOUNTER
"Requested Prescriptions   Pending Prescriptions Disp Refills     levothyroxine (SYNTHROID/LEVOTHROID) 137 MCG tablet [Pharmacy Med Name: LEVOTHYROXINE 137 MCG TABLET] 90 tablet 3     Sig: TAKE 1 TABLET (137 MCG) BY ORAL ROUTE ONCE DAILY       Thyroid Protocol Failed - 1/3/2020  9:53 AM        Failed - Normal TSH on file in past 12 months     Recent Labs   Lab Test 11/15/17  0956   TSH 0.50              Passed - Patient is 12 years or older        Passed - Recent (12 mo) or future (30 days) visit within the authorizing provider's specialty     Patient has had an office visit with the authorizing provider or a provider within the authorizing providers department within the previous 12 mos or has a future within next 30 days. See \"Patient Info\" tab in inbasket, or \"Choose Columns\" in Meds & Orders section of the refill encounter.              Passed - Medication is active on med list        Passed - No active pregnancy on record     If patient is pregnant or has had a positive pregnancy test, please check TSH.          Passed - No positive pregnancy test in past 12 months     If patient is pregnant or has had a positive pregnancy test, please check TSH.          Pt due for annual, no appt scheduled. Pt already received one month extension. Rx denied.   Susanne Soares RN on 1/3/2020 at 9:56 AM    "

## 2020-01-09 ENCOUNTER — OFFICE VISIT (OUTPATIENT)
Dept: OBGYN | Facility: CLINIC | Age: 45
End: 2020-01-09
Payer: COMMERCIAL

## 2020-01-09 ENCOUNTER — ANCILLARY PROCEDURE (OUTPATIENT)
Dept: MAMMOGRAPHY | Facility: CLINIC | Age: 45
End: 2020-01-09
Payer: COMMERCIAL

## 2020-01-09 VITALS
DIASTOLIC BLOOD PRESSURE: 70 MMHG | BODY MASS INDEX: 21.53 KG/M2 | HEIGHT: 66 IN | WEIGHT: 134 LBS | HEART RATE: 70 BPM | SYSTOLIC BLOOD PRESSURE: 100 MMHG

## 2020-01-09 DIAGNOSIS — Z01.419 ENCOUNTER FOR GYNECOLOGICAL EXAMINATION WITHOUT ABNORMAL FINDING: Primary | ICD-10-CM

## 2020-01-09 DIAGNOSIS — Z78.9 USES BIRTH CONTROL: ICD-10-CM

## 2020-01-09 DIAGNOSIS — E03.9 ACQUIRED HYPOTHYROIDISM: ICD-10-CM

## 2020-01-09 DIAGNOSIS — Z12.31 VISIT FOR SCREENING MAMMOGRAM: ICD-10-CM

## 2020-01-09 DIAGNOSIS — E03.9 HYPOTHYROIDISM, UNSPECIFIED TYPE: ICD-10-CM

## 2020-01-09 PROCEDURE — 87624 HPV HI-RISK TYP POOLED RSLT: CPT | Performed by: NURSE PRACTITIONER

## 2020-01-09 PROCEDURE — 36415 COLL VENOUS BLD VENIPUNCTURE: CPT | Performed by: NURSE PRACTITIONER

## 2020-01-09 PROCEDURE — 77067 SCR MAMMO BI INCL CAD: CPT | Mod: TC

## 2020-01-09 PROCEDURE — 84443 ASSAY THYROID STIM HORMONE: CPT | Performed by: NURSE PRACTITIONER

## 2020-01-09 PROCEDURE — G0145 SCR C/V CYTO,THINLAYER,RESCR: HCPCS | Performed by: NURSE PRACTITIONER

## 2020-01-09 PROCEDURE — 99396 PREV VISIT EST AGE 40-64: CPT | Performed by: NURSE PRACTITIONER

## 2020-01-09 RX ORDER — NORETHINDRONE ACETATE AND ETHINYL ESTRADIOL .03; 1.5 MG/1; MG/1
1 TABLET ORAL DAILY
Qty: 84 TABLET | Refills: 3 | Status: CANCELLED | OUTPATIENT
Start: 2020-01-09

## 2020-01-09 RX ORDER — LEVOTHYROXINE SODIUM 137 UG/1
137 TABLET ORAL DAILY
Qty: 90 TABLET | Refills: 3 | Status: SHIPPED | OUTPATIENT
Start: 2020-01-09 | End: 2020-03-11

## 2020-01-09 ASSESSMENT — ANXIETY QUESTIONNAIRES
7. FEELING AFRAID AS IF SOMETHING AWFUL MIGHT HAPPEN: SEVERAL DAYS
5. BEING SO RESTLESS THAT IT IS HARD TO SIT STILL: NOT AT ALL
1. FEELING NERVOUS, ANXIOUS, OR ON EDGE: SEVERAL DAYS
2. NOT BEING ABLE TO STOP OR CONTROL WORRYING: SEVERAL DAYS
6. BECOMING EASILY ANNOYED OR IRRITABLE: SEVERAL DAYS
IF YOU CHECKED OFF ANY PROBLEMS ON THIS QUESTIONNAIRE, HOW DIFFICULT HAVE THESE PROBLEMS MADE IT FOR YOU TO DO YOUR WORK, TAKE CARE OF THINGS AT HOME, OR GET ALONG WITH OTHER PEOPLE: SOMEWHAT DIFFICULT
GAD7 TOTAL SCORE: 6
3. WORRYING TOO MUCH ABOUT DIFFERENT THINGS: SEVERAL DAYS

## 2020-01-09 ASSESSMENT — MIFFLIN-ST. JEOR: SCORE: 1261.63

## 2020-01-09 ASSESSMENT — PATIENT HEALTH QUESTIONNAIRE - PHQ9
5. POOR APPETITE OR OVEREATING: SEVERAL DAYS
SUM OF ALL RESPONSES TO PHQ QUESTIONS 1-9: 6

## 2020-01-09 NOTE — LETTER
January 15, 2020    Anuhsa Robins  [address] [place] [phone]    Dear ,  This letter is regarding your recent Pap smear (cervical cancer screening) and Human Papillomavirus (HPV) test.  We are happy to inform you that your Pap smear result is normal. Cervical cancer is closely linked with certain types of HPV. Your results showed no evidence of high-risk HPV.  We recommend you have your next PAP smear and HPV test in 5 years.  You will still need to return to the clinic every year for an annual exam and other preventive tests.  If you have additional questions regarding this result, please call our registered nurse, Ruth Ann at [phone].  Sincerely,    La Lua, APRN CNP/rlm

## 2020-01-10 LAB — TSH SERPL DL<=0.005 MIU/L-ACNC: 0.34 MU/L (ref 0.4–4)

## 2020-01-10 ASSESSMENT — ANXIETY QUESTIONNAIRES: GAD7 TOTAL SCORE: 6

## 2020-01-13 DIAGNOSIS — E03.9 ACQUIRED HYPOTHYROIDISM: Primary | ICD-10-CM

## 2020-01-13 LAB
COPATH REPORT: NORMAL
PAP: NORMAL

## 2020-01-13 NOTE — RESULT ENCOUNTER NOTE
Called pt with results. Patient states she is feeling great on her levothyroxine dose of 137mcg.  Will repeat TSH in 3 months, or if she starts feeling off will call. Jessi Lua, RNC

## 2020-01-15 LAB
FINAL DIAGNOSIS: NORMAL
HPV HR 12 DNA CVX QL NAA+PROBE: NEGATIVE
HPV16 DNA SPEC QL NAA+PROBE: NEGATIVE
HPV18 DNA SPEC QL NAA+PROBE: NEGATIVE
SPECIMEN DESCRIPTION: NORMAL
SPECIMEN SOURCE CVX/VAG CYTO: NORMAL

## 2020-02-25 ENCOUNTER — TELEPHONE (OUTPATIENT)
Dept: OBGYN | Facility: CLINIC | Age: 45
End: 2020-02-25

## 2020-02-25 NOTE — TELEPHONE ENCOUNTER
"Taking 137 mcg synthroid-feeling a little \"off\" would like to adjust her dosage. Last TSH 1/9 0.34-due for recheck in March.   Has been feeling odd sensations in her nails and roots of hair, also having joint aching and not sleeping as well as she normally does. Typically feels this way when medication needs adjusting.  Susanne Soares RN on 2/25/2020 at 9:49 AM           "

## 2020-02-25 NOTE — TELEPHONE ENCOUNTER
Patient would like to increase her thyroid medication which she discussed with Jessi Lua. Please return call.

## 2020-02-25 NOTE — TELEPHONE ENCOUNTER
I would patient to come in now and repeat the TSH before changing her dose with symptoms now.  JENNIFER DamonC

## 2020-02-28 DIAGNOSIS — E03.9 ACQUIRED HYPOTHYROIDISM: ICD-10-CM

## 2020-02-28 LAB — TSH SERPL DL<=0.005 MIU/L-ACNC: 0.12 MU/L (ref 0.4–4)

## 2020-02-28 PROCEDURE — 36415 COLL VENOUS BLD VENIPUNCTURE: CPT | Performed by: NURSE PRACTITIONER

## 2020-02-28 PROCEDURE — 84443 ASSAY THYROID STIM HORMONE: CPT | Performed by: NURSE PRACTITIONER

## 2020-03-02 ENCOUNTER — TELEPHONE (OUTPATIENT)
Dept: OBGYN | Facility: CLINIC | Age: 45
End: 2020-03-02

## 2020-03-02 NOTE — RESULT ENCOUNTER NOTE
Called pt with results. LM  For patient to call me back, we need to lower dose of thyroid medication. Jessi Lua RNC

## 2020-03-11 ENCOUNTER — TELEPHONE (OUTPATIENT)
Dept: OBGYN | Facility: CLINIC | Age: 45
End: 2020-03-11

## 2020-03-11 DIAGNOSIS — E03.9 HYPOTHYROIDISM, UNSPECIFIED TYPE: ICD-10-CM

## 2020-03-11 RX ORDER — LEVOTHYROXINE SODIUM 125 UG/1
125 TABLET ORAL DAILY
Qty: 90 TABLET | Refills: 0 | Status: SHIPPED | OUTPATIENT
Start: 2020-03-11 | End: 2020-08-04

## 2020-03-11 NOTE — TELEPHONE ENCOUNTER
Patient informed.  Has appointment for lab already scheduled.  Pt verbalized understanding, in agreement with plan, and voiced no further questions.  Alessia Malhotra RN on 3/11/2020 at 11:08 AM

## 2020-03-11 NOTE — TELEPHONE ENCOUNTER
Patient reports she was to have her levothyroxin dose changed due to recent TSH results, the pharmacy has not received her new dose/rx.  Would like rx sent to [facility] [place].

## 2020-03-26 DIAGNOSIS — G43.011 INTRACTABLE MIGRAINE WITHOUT AURA AND WITH STATUS MIGRAINOSUS: Primary | ICD-10-CM

## 2020-03-26 RX ORDER — BUTALBITAL, ACETAMINOPHEN AND CAFFEINE 50; 325; 40 MG/1; MG/1; MG/1
1-2 TABLET ORAL EVERY 6 HOURS PRN
Qty: 20 TABLET | Refills: 0 | Status: SHIPPED | OUTPATIENT
Start: 2020-03-26 | End: 2021-12-16

## 2020-03-26 NOTE — TELEPHONE ENCOUNTER
Back in 2012  Dr. Lawton had given her a rx for fioricet for her occassional migraines.  She doesn't think she has had a migraine for about 3 years until last week and she has had 3 since then.  Believes it is related to the whole COVID crisis.  She actully has taken 3 pills of the 2012 rx even though they are , but feels they are not helping.  Wondering if Jessi would be willing to send a new rx at this time. Last annual 20    But/APAP/caf 1-2 tab q 6 hrs prn for migraine.  Order pended.  Routing to provider to advise. Okay to refill?  Alessia Malhotra, RN on 3/26/2020 at 1:40 PM

## 2020-04-17 DIAGNOSIS — E03.9 HYPOTHYROIDISM, UNSPECIFIED TYPE: Primary | ICD-10-CM

## 2020-05-04 DIAGNOSIS — E03.9 HYPOTHYROIDISM, UNSPECIFIED TYPE: ICD-10-CM

## 2020-05-04 LAB — TSH SERPL DL<=0.005 MIU/L-ACNC: 0.04 MU/L (ref 0.4–4)

## 2020-05-04 PROCEDURE — 84443 ASSAY THYROID STIM HORMONE: CPT | Performed by: NURSE PRACTITIONER

## 2020-05-04 PROCEDURE — 36415 COLL VENOUS BLD VENIPUNCTURE: CPT | Performed by: NURSE PRACTITIONER

## 2020-05-07 NOTE — RESULT ENCOUNTER NOTE
Called pt with results. Patient states doing well, considering life changed abruptly.  C/o of being tired more, but not sure if has to do with all the changes, otherwise feels okay on 125mcg levothyroxine. Patient does not want to change dose right now.  Will keke in a month and see where TSH is. Jessi Lua, RNC

## 2020-06-15 DIAGNOSIS — E03.9 HYPOTHYROIDISM, UNSPECIFIED TYPE: Primary | ICD-10-CM

## 2020-06-16 DIAGNOSIS — E03.9 HYPOTHYROIDISM, UNSPECIFIED TYPE: ICD-10-CM

## 2020-06-16 PROCEDURE — 36415 COLL VENOUS BLD VENIPUNCTURE: CPT | Performed by: NURSE PRACTITIONER

## 2020-06-16 PROCEDURE — 84443 ASSAY THYROID STIM HORMONE: CPT | Performed by: NURSE PRACTITIONER

## 2020-06-17 LAB — TSH SERPL DL<=0.005 MIU/L-ACNC: 0.12 MU/L (ref 0.4–4)

## 2020-06-24 ENCOUNTER — TELEPHONE (OUTPATIENT)
Dept: OBGYN | Facility: CLINIC | Age: 45
End: 2020-06-24

## 2020-06-24 NOTE — RESULT ENCOUNTER NOTE
Called pt with results.  Patient is feeling okay on levothyroxine 125mcg, but thinks could be better  She has been to [facility] endocrinology in past and was not happy with them.  Discussed she needs to see a Internist of PCP and consult further about hypothyroidism.  Patient is going to do that. Jessi Lua, RNC

## 2020-06-24 NOTE — TELEPHONE ENCOUNTER
Pt calling inquiring on recent TSH results from 6/16/20    Routing to MICHAEL Lua NP to advise and call pt w response.  Linda Pascal RN on 6/24/2020 at 2:54 PM

## 2020-08-04 DIAGNOSIS — E03.9 HYPOTHYROIDISM, UNSPECIFIED TYPE: ICD-10-CM

## 2020-08-04 RX ORDER — LEVOTHYROXINE SODIUM 125 UG/1
125 TABLET ORAL DAILY
Qty: 30 TABLET | Refills: 0 | Status: SHIPPED | OUTPATIENT
Start: 2020-08-04 | End: 2021-12-16

## 2020-08-04 NOTE — TELEPHONE ENCOUNTER
"Requested Prescriptions   Pending Prescriptions Disp Refills     levothyroxine (SYNTHROID/LEVOTHROID) 125 MCG tablet 90 tablet 0     Sig: Take 1 tablet (125 mcg) by mouth daily       Thyroid Protocol Failed - 8/4/2020  1:59 PM        Failed - Normal TSH on file in past 12 months     Recent Labs   Lab Test 06/16/20  1527   TSH 0.12*              Passed - Patient is 12 years or older        Passed - Recent (12 mo) or future (30 days) visit within the authorizing provider's specialty     Patient has had an office visit with the authorizing provider or a provider within the authorizing providers department within the previous 12 mos or has a future within next 30 days. See \"Patient Info\" tab in inbasket, or \"Choose Columns\" in Meds & Orders section of the refill encounter.              Passed - Medication is active on med list        Passed - No active pregnancy on record     If patient is pregnant or has had a positive pregnancy test, please check TSH.          Passed - No positive pregnancy test in past 12 months     If patient is pregnant or has had a positive pregnancy test, please check TSH.             Last Written Prescription Date:  3/11/20  Last Fill Quantity: 90,  # refills: 0   Last office visit: 1/9/2020 with prescribing provider:  Jessi Lua   Future Office Visit:  none    One month sent needs to f/u with PCP or endo for further refills  Susanne Soares RN on 8/4/2020 at 2:21 PM        "

## 2020-08-30 DIAGNOSIS — E03.9 HYPOTHYROIDISM, UNSPECIFIED TYPE: ICD-10-CM

## 2020-08-31 RX ORDER — LEVOTHYROXINE SODIUM 125 UG/1
TABLET ORAL
Qty: 30 TABLET | Refills: 0 | OUTPATIENT
Start: 2020-08-31

## 2020-08-31 NOTE — TELEPHONE ENCOUNTER
"Requested Prescriptions   Pending Prescriptions Disp Refills     levothyroxine (SYNTHROID/LEVOTHROID) 125 MCG tablet [Pharmacy Med Name: LEVOTHYROXINE 125 MCG TABLET] 30 tablet 0     Sig: TAKE 1 TABLET BY MOUTH EVERY DAY       Thyroid Protocol Failed - 8/30/2020 10:56 AM        Failed - Normal TSH on file in past 12 months     Recent Labs   Lab Test 06/16/20  1527   TSH 0.12*              Passed - Patient is 12 years or older        Passed - Recent (12 mo) or future (30 days) visit within the authorizing provider's specialty     Patient has had an office visit with the authorizing provider or a provider within the authorizing providers department within the previous 12 mos or has a future within next 30 days. See \"Patient Info\" tab in inbasket, or \"Choose Columns\" in Meds & Orders section of the refill encounter.              Passed - Medication is active on med list        Passed - No active pregnancy on record     If patient is pregnant or has had a positive pregnancy test, please check TSH.          Passed - No positive pregnancy test in past 12 months     If patient is pregnant or has had a positive pregnancy test, please check TSH.             Rx denied, pt to f/u with endocrinology or primary care provider  Susanne Soares RN on 8/31/2020 at 9:10 AM    "

## 2021-11-23 NOTE — PROGRESS NOTES
Anusha is a 46 year old  female who presents for annual exam.     Besides routine health maintenance, she has no other health concerns today .    HPI:here for annual exam.  She sees a endocrinologist  For her thyroid .  Her cycles are every 28 days, last 3 days and light.  Has no concerns today.    The patient doesn't have a PCP.       GYNECOLOGIC HISTORY:    Patient's last menstrual period was 2021 (approximate).    Regular menses? yes  Menses every 28-30 days.  Length of menses: 2-4 days    Her current contraception method is: none.  She  reports that she has quit smoking. She has never used smokeless tobacco.    Patient is sexually active.  STD testing offered?  Declined  Last PHQ-9 score on record =   PHQ-9 SCORE 2021   PHQ-9 Total Score 2     Last GAD7 score on record =   CASSANDRA-7 SCORE 2021   Total Score 3     Alcohol Score = 4    HEALTH MAINTENANCE:  Cholesterol:   Recent Labs   Lab Test 11/15/17  0956   CHOL 146   HDL 97   LDL 39   TRIG 52     Last Mammo: One year ago, Result: Normal, Next Mammo: Today   Pap:   Lab Results   Component Value Date    PAP NIL, HPV- 2020    PAP NIL 11/15/2017     Colonoscopy:  Never, Result: Not applicable, Next Colonoscopy: Due. Will place referral  Dexa:  NA    Health maintenance updated: no    HISTORY:  OB History    Para Term  AB Living   2 2 2 0 0 2   SAB IAB Ectopic Multiple Live Births   0 0 0 0 2      # Outcome Date GA Lbr Cesar/2nd Weight Sex Delivery Anes PTL Lv   2 Term 07 39w0d  2.722 kg (6 lb) F  EPI  MAURICE      Birth Comments: SGA infant      Name: Buffy   1 Term 05 40w0d  3.062 kg (6 lb 12 oz) M  EPI  MAURICE      Name: Ryan       Patient Active Problem List   Diagnosis     Hypothyroidism, unspecified type     Past Surgical History:   Procedure Laterality Date     NO HISTORY OF SURGERY        Social History     Tobacco Use     Smoking status: Former Smoker     Smokeless tobacco: Never Used     Tobacco comment:  "quit 1997   Substance Use Topics     Alcohol use: Yes     Alcohol/week: 0.0 standard drinks      Problem (# of Occurrences) Relation (Name,Age of Onset)    Diabetes (2) Maternal Grandmother, Other: Aunt, unspecified    No Known Problems (7) Mother, Father, Sister, Brother, Maternal Grandfather, Paternal Grandmother, Paternal Grandfather            Current Outpatient Medications   Medication Sig     butalbital-acetaminophen-caffeine (ESGIC) -40 MG tablet Take 1-2 tablets by mouth every 6 hours as needed for headaches or migraine     Esomeprazole Magnesium (NEXIUM PO)      levothyroxine (TIROSINT) 112 MCG capsule Take 1 capsule by mouth daily     Probiotic Product (PROBIOTIC PO)      norethindrone-ethinyl estradiol (GILDESS 1.5/30) 1.5-30 MG-MCG tablet Take 1 tablet by mouth daily (Patient not taking: Reported on 12/16/2021)     No current facility-administered medications for this visit.     Allergies   Allergen Reactions     Sulfa Drugs        Past medical, surgical, social and family histories were reviewed and updated in EPIC.    ROS:   12 point review of systems negative other than symptoms noted below or in the HPI.  Normal menstrual cycles    EXAM:  /60   Pulse 68   Ht 1.664 m (5' 5.5\")   Wt 58.8 kg (129 lb 9.6 oz)   LMP 11/30/2021 (Approximate)   BMI 21.24 kg/m     BMI: Body mass index is 21.24 kg/m .    PHYSICAL EXAM:  Constitutional:   Appearance: Well nourished, well developed, alert, in no acute distress  Neck:  Lymph Nodes:  No lymphadenopathy present    Thyroid:  Gland size normal, nontender, no nodules or masses present  on palpation  Chest:  Respiratory Effort:  Breathing unlabored  Cardiovascular:    Heart: Auscultation:  Regular rate, normal rhythm, no murmurs present  Breasts: Inspection of Breasts:  No lymphadenopathy present., Palpation of Breasts and Axillae:  No masses present on palpation, no breast tenderness., Axillary Lymph Nodes:  No lymphadenopathy present. and No " nodularity, asymmetry or nipple discharge bilaterally.  Gastrointestinal:   Abdominal Examination:  Abdomen nontender to palpation, tone normal without rigidity or guarding, no masses present, umbilicus without lesions   Liver and Spleen:  No hepatomegaly present, liver nontender to palpation    Hernias:  No hernias present  Lymphatic: Lymph Nodes:  No other lymphadenopathy present  Skin:  General Inspection:  No rashes present, no lesions present, no areas of  discoloration  Neurologic:    Mental Status:  Oriented X3.  Normal strength and tone, sensory exam                grossly normal, mentation intact and speech normal.    Psychiatric:   Mentation appears normal and affect normal/bright.         Pelvic Exam:  External Genitalia:     Normal appearance for age, no discharge present, no tenderness present, no inflammatory lesions present, color normal  Vagina:     Normal vaginal vault without central or paravaginal defects, no discharge present, no inflammatory lesions present, no masses present  Bladder:     Nontender to palpation  Urethra:   Urethral Body:  Urethra palpation normal, urethra structural support normal   Urethral Meatus:  No erythema or lesions present  Cervix:     Appearance healthy, no lesions present, nontender to palpation, no bleeding present  Uterus:     Uterus: firm, normal sized and nontender, anteverted in position.   Adnexa:     No adnexal tenderness present, no adnexal masses present  Perineum:     Perineum within normal limits, no evidence of trauma, no rashes or skin lesions present  Anus:     Anus within normal limits, no hemorrhoids present  Inguinal Lymph Nodes:     No lymphadenopathy present  Pubic Hair:     Normal pubic hair distribution for age  Genitalia and Groin:     No rashes present, no lesions present, no areas of discoloration, no masses present      COUNSELING:   Reviewed preventive health counseling, as reflected in patient instructions       Regular exercise        Healthy diet/nutrition       Contraception    BMI: Body mass index is 21.24 kg/m .      ASSESSMENT:  46 year old female with satisfactory annual exam.    ICD-10-CM    1. Encounter for gynecological examination without abnormal finding  Z01.419    2. Screening for cervical cancer  Z12.4 Pap thin layer screen with HPV - recommended age 30 - 65 years   3. Colon cancer screening  Z12.11 Adult Gastro Ref - Procedure Only   4. Intractable migraine without aura and with status migrainosus  G43.011 butalbital-acetaminophen-caffeine (ESGIC) -40 MG tablet   5. Screening for ischemic heart disease  Z13.6 Lipid Profile   6. Screening for diabetes mellitus  Z13.1 Comprehensive metabolic panel       PLAN:  Normal Gyn exam.  Patient will return fasting for blood work.  Return prn or 1 year for annual and pap.    ALICIA Rudolph CNP

## 2021-12-16 ENCOUNTER — ANCILLARY PROCEDURE (OUTPATIENT)
Dept: MAMMOGRAPHY | Facility: CLINIC | Age: 46
End: 2021-12-16
Payer: COMMERCIAL

## 2021-12-16 ENCOUNTER — OFFICE VISIT (OUTPATIENT)
Dept: OBGYN | Facility: CLINIC | Age: 46
End: 2021-12-16
Payer: COMMERCIAL

## 2021-12-16 VITALS
BODY MASS INDEX: 20.83 KG/M2 | HEIGHT: 66 IN | SYSTOLIC BLOOD PRESSURE: 102 MMHG | WEIGHT: 129.6 LBS | DIASTOLIC BLOOD PRESSURE: 60 MMHG | HEART RATE: 68 BPM

## 2021-12-16 DIAGNOSIS — Z12.4 SCREENING FOR CERVICAL CANCER: ICD-10-CM

## 2021-12-16 DIAGNOSIS — G43.011 INTRACTABLE MIGRAINE WITHOUT AURA AND WITH STATUS MIGRAINOSUS: ICD-10-CM

## 2021-12-16 DIAGNOSIS — Z13.1 SCREENING FOR DIABETES MELLITUS: ICD-10-CM

## 2021-12-16 DIAGNOSIS — Z12.11 COLON CANCER SCREENING: ICD-10-CM

## 2021-12-16 DIAGNOSIS — Z12.31 VISIT FOR SCREENING MAMMOGRAM: ICD-10-CM

## 2021-12-16 DIAGNOSIS — Z13.6 SCREENING FOR ISCHEMIC HEART DISEASE: ICD-10-CM

## 2021-12-16 DIAGNOSIS — Z01.419 ENCOUNTER FOR GYNECOLOGICAL EXAMINATION WITHOUT ABNORMAL FINDING: Primary | ICD-10-CM

## 2021-12-16 PROCEDURE — 87624 HPV HI-RISK TYP POOLED RSLT: CPT | Performed by: NURSE PRACTITIONER

## 2021-12-16 PROCEDURE — G0145 SCR C/V CYTO,THINLAYER,RESCR: HCPCS | Performed by: NURSE PRACTITIONER

## 2021-12-16 PROCEDURE — 77067 SCR MAMMO BI INCL CAD: CPT | Mod: TC | Performed by: RADIOLOGY

## 2021-12-16 PROCEDURE — 99396 PREV VISIT EST AGE 40-64: CPT | Performed by: NURSE PRACTITIONER

## 2021-12-16 RX ORDER — LEVOTHYROXINE SODIUM 112 UG/1
1 CAPSULE ORAL DAILY
COMMUNITY
Start: 2020-11-27

## 2021-12-16 RX ORDER — BUTALBITAL, ACETAMINOPHEN AND CAFFEINE 50; 325; 40 MG/1; MG/1; MG/1
1-2 TABLET ORAL EVERY 6 HOURS PRN
Qty: 20 TABLET | Refills: 0 | Status: SHIPPED | OUTPATIENT
Start: 2021-12-16 | End: 2023-03-09

## 2021-12-16 ASSESSMENT — ANXIETY QUESTIONNAIRES
1. FEELING NERVOUS, ANXIOUS, OR ON EDGE: SEVERAL DAYS
6. BECOMING EASILY ANNOYED OR IRRITABLE: NOT AT ALL
5. BEING SO RESTLESS THAT IT IS HARD TO SIT STILL: NOT AT ALL
2. NOT BEING ABLE TO STOP OR CONTROL WORRYING: SEVERAL DAYS
3. WORRYING TOO MUCH ABOUT DIFFERENT THINGS: SEVERAL DAYS
7. FEELING AFRAID AS IF SOMETHING AWFUL MIGHT HAPPEN: NOT AT ALL
IF YOU CHECKED OFF ANY PROBLEMS ON THIS QUESTIONNAIRE, HOW DIFFICULT HAVE THESE PROBLEMS MADE IT FOR YOU TO DO YOUR WORK, TAKE CARE OF THINGS AT HOME, OR GET ALONG WITH OTHER PEOPLE: NOT DIFFICULT AT ALL
GAD7 TOTAL SCORE: 3

## 2021-12-16 ASSESSMENT — MIFFLIN-ST. JEOR: SCORE: 1231.67

## 2021-12-16 ASSESSMENT — PATIENT HEALTH QUESTIONNAIRE - PHQ9
SUM OF ALL RESPONSES TO PHQ QUESTIONS 1-9: 2
5. POOR APPETITE OR OVEREATING: NOT AT ALL

## 2021-12-17 ASSESSMENT — ANXIETY QUESTIONNAIRES: GAD7 TOTAL SCORE: 3

## 2021-12-20 LAB
BKR LAB AP GYN ADEQUACY: NORMAL
BKR LAB AP GYN INTERPRETATION: NORMAL
BKR LAB AP HPV REFLEX: NORMAL
BKR LAB AP LMP: NORMAL
BKR LAB AP PREVIOUS ABNORMAL: NORMAL
PATH REPORT.COMMENTS IMP SPEC: NORMAL
PATH REPORT.COMMENTS IMP SPEC: NORMAL
PATH REPORT.RELEVANT HX SPEC: NORMAL

## 2021-12-21 LAB
HUMAN PAPILLOMA VIRUS 16 DNA: NEGATIVE
HUMAN PAPILLOMA VIRUS 18 DNA: NEGATIVE
HUMAN PAPILLOMA VIRUS FINAL DIAGNOSIS: NORMAL
HUMAN PAPILLOMA VIRUS OTHER HR: NEGATIVE

## 2021-12-22 NOTE — RESULT ENCOUNTER NOTE
Normal pap/Neg HPV letter sent through [facility] results. Next pap smear  due in 1 year.     Ruth Ann Hidalgo, RN, BSN  Pap Tracking Nurse

## 2021-12-26 ENCOUNTER — HEALTH MAINTENANCE LETTER (OUTPATIENT)
Age: 46
End: 2021-12-26

## 2022-01-13 ENCOUNTER — TELEPHONE (OUTPATIENT)
Dept: GASTROENTEROLOGY | Facility: CLINIC | Age: 47
End: 2022-01-13
Payer: COMMERCIAL

## 2022-01-13 NOTE — TELEPHONE ENCOUNTER
Screening Questions  1. Are you active on mychart? Y    2. What insurance is in the chart? HEALTH"[employer]     2.  Ordering/Referring Provider: La Lua APRN CNP    3. BMI 21.5, If greater than 40 review exclusion criteria also will need EXTENDED PREP    4.  Respiratory Screening (If yes to any of the following HOSPITAL setting only):     Do you use daily home oxygen? N  Do you have mod to severe Obstructive Sleep Apnea? N   Do you have Pulmonary Hypertension? N   Do you have UNCONTROLLED asthma? N    5. Have you had a heart or lung transplant? N  (If yes, please review exclusion criteria)    6. Are you currently on dialysis?N  (If yes, schedule in HOSPITAL setting only)(If yes, please send Golytely prep)    7. Do you have chronic kidney disease? N (If yes, please send Golytely prep)    7. Have you had a stroke or Transient ischemic attack (TIA) within 6 months? N (If yes, do not schedule at [facility])    8. In the past 6 months, have you had any heart related issues including cardiomyopathy or heart attack? N (If yes, please review exclusion criteria)           If yes, did it require cardiac stenting or other implantable device?N  (If yes, please review exclusion criteria)      9. Do you have any implantable devices in your body (pacemaker, defib, LVAD)? N (If yes, schedule at [facility])    10. Do you take nitroglycerin? If yes, how often? N (if yes, schedule at HOSPITAL setting)    11. Are you currently taking any blood thinners?N (If yes- inform patient to follow up with PCP or provider for follow up instructions)     12. Are you a diabetic? N (If yes, please send Golytely prep)    13. (Females) Are you currently pregnant? N  If yes, how many weeks?      15. Are you taking any prescription pain medications on a routine schedule? N If yes, MAC sedation and patient will need EXTENDED PREP.    16. Do you have any chemical dependencies such as alcohol, street drugs, or methadone? N If yes, MAC sedation and  patient will need EXTENDED PREP    17. Do you have any history of post-traumatic stress syndrome, severe anxiety or history of psychosis? N  If yes, MAC sedation.     18. Do you transfer independently? Y    19.  Do you have any issues with constipation? N   If yes, pt will need EXTENDED PREP     20. Preferred Pharmacy for Pre Prescription [id number] [place] [facility] [address]    Scheduling Details    Which Colonoscopy Prep was Sent?: MPREP  Type of Procedure Scheduled: COLON  Surgeon: JASMIN  Date of Procedure: 02/28   [facility]  [facility]  Caller (Please ask for phone number if not scheduled by patient): Anusha Robins        Sedation Type: CS  Conscious Sedation- Needs  for 6 hours after the procedure  MAC/General-Needs  for 24 hours after procedure    Pre-op Required at [facility], [place], [place] and OR for MAC sedation: N  (if yes advise patient they will need a pre-op prior to procedure)      Informed patient they will need an adult  Y  Cannot take any type of public or medical transportation alone    Pre-Procedure Covid test to be completed at [facility] or Externally: OX 02/21 230    Confirmed Nurse will call to complete assessment Y    Additional comments:

## 2022-02-07 DIAGNOSIS — Z11.59 ENCOUNTER FOR SCREENING FOR OTHER VIRAL DISEASES: Primary | ICD-10-CM

## 2022-02-21 ENCOUNTER — LAB (OUTPATIENT)
Dept: URGENT CARE | Facility: URGENT CARE | Age: 47
End: 2022-02-21
Payer: COMMERCIAL

## 2022-02-21 DIAGNOSIS — Z11.59 ENCOUNTER FOR SCREENING FOR OTHER VIRAL DISEASES: ICD-10-CM

## 2022-02-21 PROCEDURE — U0003 INFECTIOUS AGENT DETECTION BY NUCLEIC ACID (DNA OR RNA); SEVERE ACUTE RESPIRATORY SYNDROME CORONAVIRUS 2 (SARS-COV-2) (CORONAVIRUS DISEASE [COVID-19]), AMPLIFIED PROBE TECHNIQUE, MAKING USE OF HIGH THROUGHPUT TECHNOLOGIES AS DESCRIBED BY CMS-2020-01-R: HCPCS

## 2022-02-21 PROCEDURE — U0005 INFEC AGEN DETEC AMPLI PROBE: HCPCS

## 2022-02-22 LAB — SARS-COV-2 RNA RESP QL NAA+PROBE: NEGATIVE

## 2022-02-25 ENCOUNTER — APPOINTMENT (OUTPATIENT)
Dept: SURGERY | Facility: PHYSICIAN GROUP | Age: 47
End: 2022-02-25
Payer: COMMERCIAL

## 2022-02-25 ENCOUNTER — HOSPITAL ENCOUNTER (OUTPATIENT)
Facility: CLINIC | Age: 47
Discharge: HOME | End: 2022-02-25
Attending: SURGERY | Admitting: SURGERY
Payer: COMMERCIAL

## 2022-02-25 VITALS
DIASTOLIC BLOOD PRESSURE: 68 MMHG | WEIGHT: 129 LBS | HEART RATE: 93 BPM | RESPIRATION RATE: 32 BRPM | SYSTOLIC BLOOD PRESSURE: 101 MMHG | HEIGHT: 65 IN | OXYGEN SATURATION: 98 % | BODY MASS INDEX: 21.49 KG/M2

## 2022-02-25 LAB — COLONOSCOPY: NORMAL

## 2022-02-25 PROCEDURE — 99153 MOD SED SAME PHYS/QHP EA: CPT | Mod: 59 | Performed by: SURGERY

## 2022-02-25 PROCEDURE — G0500 MOD SEDAT ENDO SERVICE >5YRS: HCPCS | Performed by: SURGERY

## 2022-02-25 PROCEDURE — 45378 DIAGNOSTIC COLONOSCOPY: CPT | Performed by: SURGERY

## 2022-02-25 PROCEDURE — 99153 MOD SED SAME PHYS/QHP EA: CPT

## 2022-02-25 PROCEDURE — G0121 COLON CA SCRN NOT HI RSK IND: HCPCS | Performed by: SURGERY

## 2022-02-25 PROCEDURE — 99152 MOD SED SAME PHYS/QHP 5/>YRS: CPT | Mod: 59 | Performed by: SURGERY

## 2022-02-25 PROCEDURE — 250N000011 HC RX IP 250 OP 636: Performed by: SURGERY

## 2022-02-25 RX ORDER — FENTANYL CITRATE 50 UG/ML
INJECTION, SOLUTION INTRAMUSCULAR; INTRAVENOUS PRN
Status: DISCONTINUED | OUTPATIENT
Start: 2022-02-25 | End: 2022-02-25 | Stop reason: HOSPADM

## 2022-02-25 RX ORDER — ONDANSETRON 2 MG/ML
4 INJECTION INTRAMUSCULAR; INTRAVENOUS
Status: DISCONTINUED | OUTPATIENT
Start: 2022-02-25 | End: 2022-02-25 | Stop reason: HOSPADM

## 2022-02-25 RX ORDER — ONDANSETRON 2 MG/ML
INJECTION INTRAMUSCULAR; INTRAVENOUS PRN
Status: DISCONTINUED | OUTPATIENT
Start: 2022-02-25 | End: 2022-02-25 | Stop reason: HOSPADM

## 2022-02-25 RX ORDER — LIDOCAINE 40 MG/G
CREAM TOPICAL
Status: DISCONTINUED | OUTPATIENT
Start: 2022-02-25 | End: 2022-02-25 | Stop reason: HOSPADM

## 2022-02-25 RX ADMIN — FENTANYL CITRATE 50 MCG: 50 INJECTION, SOLUTION INTRAMUSCULAR; INTRAVENOUS at 09:26

## 2022-02-25 RX ADMIN — FENTANYL CITRATE 100 MCG: 50 INJECTION, SOLUTION INTRAMUSCULAR; INTRAVENOUS at 09:20

## 2022-02-25 RX ADMIN — MIDAZOLAM 1 MG: 1 INJECTION INTRAMUSCULAR; INTRAVENOUS at 09:23

## 2022-02-25 RX ADMIN — MIDAZOLAM 2 MG: 1 INJECTION INTRAMUSCULAR; INTRAVENOUS at 09:20

## 2022-02-25 RX ADMIN — ONDANSETRON 4 MG: 2 INJECTION INTRAMUSCULAR; INTRAVENOUS at 09:32

## 2022-02-25 NOTE — H&P
[facility] Anesthesia Pre-op History and Physical    Anusha Robins MRN# [id number]   Age: 46 year old YOB: 1975      Date of Surgery: 2/25/2022 [facility]      Date of Exam 2/25/2022 Facility (Same day)     Primary care provider: [facility]         Chief Complaint and/or Reason for Procedure:   Screening for colon malignancy         Active problem list:     Patient Active Problem List    Diagnosis Date Noted     Hypothyroidism, unspecified type 10/10/2016     Priority: Medium            Medications (include herbals and vitamins):   Any Plavix use in the last 7 days?  No     No current facility-administered medications for this encounter.             Allergies:      Allergies   Allergen Reactions     Sulfa Drugs Hives          Physical Exam:   All vitals have been reviewed  No data found.  No intake/output data recorded.  Airway assessment:   Patient is able to open mouth wide}      Neck:   supple, symmetrical, trachea midline     Lungs:   No increased work of breathing, good air exchange, clear to auscultation bilaterally, no crackles or wheezing     Cardiovascular:   normal apical pulses              Lab / Radiology Results:   Reviewed         Anesthetic risk and/or ASA classification:   ASA Class 1    Renny Forman MD

## 2022-02-25 NOTE — OP NOTE
General Surgery Brief Operative Note    Preoperative Diagnosis- Screening for malignant neoplasm    Postoperative Diagnosis- Same    Procedure- Colonoscopy-     Surgeon- Dasia    Anesthesia- Conscious sedation for 25 minutes    EBL- Minimal    Findings- Normal colon    Specimen- None    Complications- None    Renny Forman M.D.  [facility]  [phone]

## 2022-10-22 ENCOUNTER — HEALTH MAINTENANCE LETTER (OUTPATIENT)
Age: 47
End: 2022-10-22

## 2023-03-08 NOTE — PROGRESS NOTES
Anusha is a 47 year old  female who presents for annual exam.     Besides routine health maintenance, she would like to discuss perimenopause. Her cycles have been changing. They have been getting heavier.    HPI:  Patient presents today for annual well woman exam with pap. Reports her periods have started to come more frequently and heavier. Patient wondering if this could be perimenopausal in etiology. Endorses that the last two months during her menses she felt nausea and dizziness along with the HMB. Does state she has mild vaginal dryness. Otherwise denies  vasomotor symptoms of menopause or genitourinary symptoms of menopause. Is seeing endocrinology for Hashimoto's management. Recently they have been having to switch around her medication dosage. Requests other lab work, is not fasting but will come back for future labs. Otherwise, no other concerns. Patient agreeable to have student perform exam component of today's visit.      GYNECOLOGIC HISTORY:    No LMP recorded. (Menstrual status: Irregular Periods).  Regular menses? No, coming a few days earlier, and getting heavier  Length of menses: 3 days  Her current contraception method is: none.  She  reports that she has quit smoking. She has never used smokeless tobacco.  Patient is sexually active.  STD testing offered?  Declined     Last PHQ-9 score on record =   PHQ-9 SCORE 3/9/2023   PHQ-9 Total Score 5     Last GAD7 score on record =   CASSANDRA-7 SCORE 3/9/2023   Total Score 7     Alcohol Score = 2    HEALTH MAINTENANCE:  Cholesterol:   Recent Labs   Lab Test 11/15/17  0956   CHOL 146   HDL 97   LDL 39   TRIG 52     Last Mammo: 21, Result: Normal, Next Mammo: Today   Pap:   Lab Results   Component Value Date    GYNINTERP NIL, NEG-HPV 2021    PAP NIL, NEG-HPV 2020    PAP NIL, NEG-HPV 11/15/2017      Colonoscopy:  22, Result: Normal, Next Colonoscopy: 10 years.  Dexa:  N/A    Health maintenance updated:  reviewed    HISTORY:  OB  History    Para Term  AB Living   2 2 2 0 0 2   SAB IAB Ectopic Multiple Live Births   0 0 0 0 2      # Outcome Date GA Lbr Cesar/2nd Weight Sex Delivery Anes PTL Lv   2 Term 07 39w0d  2.722 kg (6 lb) F  EPI  MAURICE      Birth Comments: SGA infant      Name: Buffy Gallardo Term 05 40w0d  3.062 kg (6 lb 12 oz) M  EPI  MAURICE      Name: Ryan       Patient Active Problem List   Diagnosis     Hypothyroidism, unspecified type     Past Surgical History:   Procedure Laterality Date     COLONOSCOPY N/A 2022    Procedure: COLONOSCOPY;  Surgeon: Renny Forman MD;  Location:  GI     NO HISTORY OF SURGERY        Social History     Tobacco Use     Smoking status: Former     Smokeless tobacco: Never     Tobacco comments:     quit    Substance Use Topics     Alcohol use: Yes     Alcohol/week: 0.0 standard drinks      Problem (# of Occurrences) Relation (Name,Age of Onset)    Diabetes (2) Maternal Grandmother, Other: Aunt, unspecified    No Known Problems (7) Mother, Father, Sister, Brother, Maternal Grandfather, Paternal Grandmother, Paternal Grandfather            Current Outpatient Medications   Medication Sig     butalbital-acetaminophen-caffeine (ESGIC) -40 MG tablet Take 1-2 tablets by mouth every 6 hours as needed for headaches or migraine     Esomeprazole Magnesium (NEXIUM PO)      levothyroxine (TIROSINT) 112 MCG capsule Take 1 capsule by mouth daily     Multiple Vitamins-Minerals (MULTIVITAL PO)      Probiotic Product (PROBIOTIC PO)      VITAMIN D PO      No current facility-administered medications for this visit.     Allergies   Allergen Reactions     Sulfa Drugs Hives       Past medical, surgical, social and family histories were reviewed and updated in EPIC.    ROS:   12 point review of systems negative other than symptoms noted below or in the HPI.  Genitourinary: Heavy Bleeding with Period and Irregular Menses  No urinary frequency or dysuria, bladder or kidney  "problems, POSITIVE for:, irregular menses, heavy periods    EXAM:  /62   Ht 1.664 m (5' 5.5\")   Wt 62.6 kg (138 lb)   BMI 22.62 kg/m     BMI: Body mass index is 22.62 kg/m .    PHYSICAL EXAM:  Constitutional:   Appearance: Well nourished, well developed, alert, in no acute distress  Neck:  Lymph Nodes:  No lymphadenopathy present    Thyroid:  Gland size normal, nontender, no nodules or masses present  on palpation  Chest:  Respiratory Effort:  Breathing unlabored  Cardiovascular:    Heart: Auscultation:  Regular rate, normal rhythm, no murmurs present  Breasts: Inspection of Breasts:  No lymphadenopathy present., Palpation of Breasts and Axillae:  No masses present on palpation, no breast tenderness., Axillary Lymph Nodes:  No lymphadenopathy present. and No nodularity, asymmetry or nipple discharge bilaterally.  Gastrointestinal:   Abdominal Examination:  Abdomen nontender to palpation, tone normal without rigidity or guarding, no masses present, umbilicus without lesions   Liver and Spleen:  No hepatomegaly present, liver nontender to palpation    Hernias:  No hernias present  Lymphatic: Lymph Nodes:  No other lymphadenopathy present  Skin:  General Inspection:  No rashes present, no lesions present, no areas of  discoloration  Neurologic:    Mental Status:  Oriented X3.  Normal strength and tone, sensory exam                grossly normal, mentation intact and speech normal.    Psychiatric:   Mentation appears normal and affect normal/bright.         Pelvic Exam:  External Genitalia:     Normal appearance for age, no discharge present, no tenderness present, no inflammatory lesions present, color normal  Vagina:     Normal vaginal vault without central or paravaginal defects, no discharge present, no inflammatory lesions present, no masses present  Bladder:     Nontender to palpation  Urethra:   Urethral Body:  Urethra palpation normal, urethra structural support normal   Urethral Meatus:  No erythema or " lesions present  Cervix:     Appearance healthy, no lesions present, nontender to palpation, no bleeding present. Pap collected. Cervix friable, mild bleeding after swab.  Uterus:     Uterus: firm, normal sized and nontender, retroverted in position.   Adnexa:     No adnexal tenderness present, no adnexal masses present  Perineum:     Perineum within normal limits, no evidence of trauma, no rashes or skin lesions present  Anus:     Anus within normal limits, no hemorrhoids present  Inguinal Lymph Nodes:     No lymphadenopathy present  Pubic Hair:     Normal pubic hair distribution for age  Genitalia and Groin:     No rashes present, no lesions present, no areas of discoloration, no masses present      COUNSELING:   Reviewed preventive health counseling, as reflected in patient instructions  Special attention given to:        Regular exercise       Healthy diet/nutrition       (Megan)menopause management    BMI: Body mass index is 22.62 kg/m .      ASSESSMENT:  47 year old female with satisfactory annual exam.    ICD-10-CM    1. Encounter for gynecological examination without abnormal finding  Z01.419 Pap thin layer screen with HPV - recommended age 30 - 65 years      2. Intractable migraine without aura and with status migrainosus  G43.011 butalbital-acetaminophen-caffeine (ESGIC) -40 MG tablet      3. Screening for ischemic heart disease  Z13.6 Lipid Profile      4. Screening for diabetes mellitus  Z13.1 Comprehensive metabolic panel      5. Menorrhagia with regular cycle  N92.0 US Transvaginal Pelvic Non-OB          PLAN:  Normal gyn exam with pap collection. Will notify patient of results once available. She is not fasting today so will return for lab work. TVUS ordered for HMB with nausea and dizziness to rule out any anatomical abnormalities. If TVUS negative, likely perimenopausal in nature. Mammogram today after visit.  RTC annually for well woman visit or prn.    I, Liza Damon, completed the  PFSH and ROS. I then acted as a scribe for ALICIA Rudolph CNP for the remainder of the visit.  Liza WINSLOW, RN  [facility] DNP, WHNP student    I was present with the student who participated in the service and in the documentation of the note. I have verified the history and medical decision-making.  Student participated in the annual exam and I can attest to being personally present for the entire exam. I agree with the assessment and plan of care as documented in the note. ALICIA Rudolph CNP, APRN CNP

## 2023-03-09 ENCOUNTER — ANCILLARY PROCEDURE (OUTPATIENT)
Dept: MAMMOGRAPHY | Facility: CLINIC | Age: 48
End: 2023-03-09
Payer: COMMERCIAL

## 2023-03-09 ENCOUNTER — OFFICE VISIT (OUTPATIENT)
Dept: OBGYN | Facility: CLINIC | Age: 48
End: 2023-03-09
Payer: COMMERCIAL

## 2023-03-09 VITALS
HEIGHT: 66 IN | DIASTOLIC BLOOD PRESSURE: 62 MMHG | WEIGHT: 138 LBS | SYSTOLIC BLOOD PRESSURE: 118 MMHG | BODY MASS INDEX: 22.18 KG/M2

## 2023-03-09 DIAGNOSIS — Z12.31 VISIT FOR SCREENING MAMMOGRAM: ICD-10-CM

## 2023-03-09 DIAGNOSIS — Z13.1 SCREENING FOR DIABETES MELLITUS: ICD-10-CM

## 2023-03-09 DIAGNOSIS — Z01.419 ENCOUNTER FOR GYNECOLOGICAL EXAMINATION WITHOUT ABNORMAL FINDING: Primary | ICD-10-CM

## 2023-03-09 DIAGNOSIS — Z13.6 SCREENING FOR ISCHEMIC HEART DISEASE: ICD-10-CM

## 2023-03-09 DIAGNOSIS — N92.0 MENORRHAGIA WITH REGULAR CYCLE: ICD-10-CM

## 2023-03-09 DIAGNOSIS — G43.011 INTRACTABLE MIGRAINE WITHOUT AURA AND WITH STATUS MIGRAINOSUS: ICD-10-CM

## 2023-03-09 PROCEDURE — G0145 SCR C/V CYTO,THINLAYER,RESCR: HCPCS | Performed by: NURSE PRACTITIONER

## 2023-03-09 PROCEDURE — 99396 PREV VISIT EST AGE 40-64: CPT | Performed by: NURSE PRACTITIONER

## 2023-03-09 PROCEDURE — 77067 SCR MAMMO BI INCL CAD: CPT | Mod: TC | Performed by: RADIOLOGY

## 2023-03-09 PROCEDURE — 87624 HPV HI-RISK TYP POOLED RSLT: CPT | Performed by: NURSE PRACTITIONER

## 2023-03-09 PROCEDURE — 99213 OFFICE O/P EST LOW 20 MIN: CPT | Mod: 25 | Performed by: NURSE PRACTITIONER

## 2023-03-09 RX ORDER — BUTALBITAL, ACETAMINOPHEN AND CAFFEINE 50; 325; 40 MG/1; MG/1; MG/1
1-2 TABLET ORAL EVERY 6 HOURS PRN
Qty: 20 TABLET | Refills: 0 | Status: SHIPPED | OUTPATIENT
Start: 2023-03-09 | End: 2024-01-02

## 2023-03-09 RX ORDER — LEVOTHYROXINE SODIUM 100 UG/1
CAPSULE ORAL
COMMUNITY
Start: 2023-03-06 | End: 2023-03-09

## 2023-03-09 ASSESSMENT — ANXIETY QUESTIONNAIRES
GAD7 TOTAL SCORE: 7
IF YOU CHECKED OFF ANY PROBLEMS ON THIS QUESTIONNAIRE, HOW DIFFICULT HAVE THESE PROBLEMS MADE IT FOR YOU TO DO YOUR WORK, TAKE CARE OF THINGS AT HOME, OR GET ALONG WITH OTHER PEOPLE: SOMEWHAT DIFFICULT
5. BEING SO RESTLESS THAT IT IS HARD TO SIT STILL: NOT AT ALL
GAD7 TOTAL SCORE: 7
3. WORRYING TOO MUCH ABOUT DIFFERENT THINGS: SEVERAL DAYS
1. FEELING NERVOUS, ANXIOUS, OR ON EDGE: MORE THAN HALF THE DAYS
2. NOT BEING ABLE TO STOP OR CONTROL WORRYING: SEVERAL DAYS
6. BECOMING EASILY ANNOYED OR IRRITABLE: MORE THAN HALF THE DAYS
7. FEELING AFRAID AS IF SOMETHING AWFUL MIGHT HAPPEN: SEVERAL DAYS

## 2023-03-09 ASSESSMENT — PATIENT HEALTH QUESTIONNAIRE - PHQ9
5. POOR APPETITE OR OVEREATING: NOT AT ALL
SUM OF ALL RESPONSES TO PHQ QUESTIONS 1-9: 5

## 2023-03-14 LAB
BKR LAB AP GYN ADEQUACY: NORMAL
BKR LAB AP GYN INTERPRETATION: NORMAL
BKR LAB AP HPV REFLEX: NORMAL
BKR LAB AP PREVIOUS ABNORMAL: NORMAL
PATH REPORT.COMMENTS IMP SPEC: NORMAL
PATH REPORT.COMMENTS IMP SPEC: NORMAL
PATH REPORT.RELEVANT HX SPEC: NORMAL

## 2023-04-05 ENCOUNTER — TELEPHONE (OUTPATIENT)
Dept: OBGYN | Facility: CLINIC | Age: 48
End: 2023-04-05
Payer: COMMERCIAL

## 2023-04-05 NOTE — TELEPHONE ENCOUNTER
Reason for call:  Other   Patient called regarding (reason for call): call back  Additional comments: Patient is calling today to ask Jessi to call her back regarding if she can see someone else other than [facility] about her gallbladder issues?    Phone number to reach patient:  Cell number on file:    Telephone Information:   Mobile [phone]       Best Time:  Today please    Can we leave a detailed message on this number?  YES    Travel screening:

## 2023-04-05 NOTE — TELEPHONE ENCOUNTER
Talked with patient her insurance changed, she did get a video visit with someone for gallbladder issues.  She has known she has some gallstones in the past.  Also discussed seeing  A PCP to start also. Patient is going to do that, but also going to check with [facility] if she had seen them in the past for her gallbladder.  LISBET Damon

## 2023-04-07 NOTE — PROGRESS NOTES
"  SUBJECTIVE:                                                   Anusha Robins is a 47 year old female who presents to clinic today for the following health issue(s):  No chief complaint on file.      Additional information: ***    HPI:  ***    No LMP recorded. (Menstrual status: Irregular Periods)..     Patient {is/is not:211497::\"is\"} sexually active, .  Using {PLC CONTRACEPTION:817769} for contraception.    reports that she has quit smoking. She has never used smokeless tobacco.  {Tobacco Cessation -- Delete if patient is a non-smoker:205792}  STD testing offered?  {PLC GC/CHLAMYDIA:796380}    Health maintenance updated:  {yes no:425014}    Today's PHQ-2 Score:     3/9/2023     2:57 PM   PHQ-2 (  [employer])   Q1: Little interest or pleasure in doing things 1   Q2: Feeling down, depressed or hopeless 1   PHQ-2 Score 2     Today's PHQ-9 Score:       3/9/2023     2:57 PM   PHQ-9 SCORE   PHQ-9 Total Score 5     Today's CASSANDRA-7 Score:       3/9/2023     2:57 PM   CASSANDRA-7 SCORE   Total Score 7       Problem list and histories reviewed & adjusted, as indicated.  Additional history: as documented.    Patient Active Problem List   Diagnosis     Hypothyroidism, unspecified type     Past Surgical History:   Procedure Laterality Date     COLONOSCOPY N/A 2022    Procedure: COLONOSCOPY;  Surgeon: Renny Forman MD;  Location:  GI     NO HISTORY OF SURGERY        Social History     Tobacco Use     Smoking status: Former     Smokeless tobacco: Never     Tobacco comments:     quit    Vaping Use     Vaping status: Not on file   Substance Use Topics     Alcohol use: Yes     Alcohol/week: 0.0 standard drinks of alcohol      Problem (# of Occurrences) Relation (Name,Age of Onset)    Diabetes (2) Maternal Grandmother, Other: Aunt, unspecified    No Known Problems (7) Mother, Father, Sister, Brother, Maternal Grandfather, Paternal Grandmother, Paternal Grandfather            Current Outpatient Medications " "  Medication Sig     butalbital-acetaminophen-caffeine (ESGIC) -40 MG tablet Take 1-2 tablets by mouth every 6 hours as needed for headaches or migraine     Esomeprazole Magnesium (NEXIUM PO)      levothyroxine (TIROSINT) 112 MCG capsule Take 1 capsule by mouth daily     Multiple Vitamins-Minerals (MULTIVITAL PO)      Probiotic Product (PROBIOTIC PO)      VITAMIN D PO      No current facility-administered medications for this visit.     Allergies   Allergen Reactions     Sulfa Drugs Hives       ROS:  {[facility] ROSGYN:322887::\"12 point review of systems negative other than symptoms noted below or in the HPI.\"}  {ROS - :367037::\"No urinary frequency or dysuria, bladder or kidney problems\"}      OBJECTIVE:     There were no vitals taken for this visit.  There is no height or weight on file to calculate BMI.    Exam:  {[facility] EXAM CHOICES:909092}     In-Clinic Test Results:  No results found for this or any previous visit (from the past 24 hour(s)).    ASSESSMENT/PLAN:                                                      No diagnosis found.    There are no Patient Instructions on file for this visit.    ***    ALICIA Rudolph [facility]  "

## 2023-04-10 ENCOUNTER — OFFICE VISIT (OUTPATIENT)
Dept: OBGYN | Facility: CLINIC | Age: 48
End: 2023-04-10
Attending: NURSE PRACTITIONER
Payer: COMMERCIAL

## 2023-04-10 ENCOUNTER — ANCILLARY PROCEDURE (OUTPATIENT)
Dept: ULTRASOUND IMAGING | Facility: CLINIC | Age: 48
End: 2023-04-10
Attending: NURSE PRACTITIONER
Payer: COMMERCIAL

## 2023-04-10 VITALS
HEIGHT: 65 IN | BODY MASS INDEX: 22.99 KG/M2 | HEART RATE: 68 BPM | WEIGHT: 138 LBS | DIASTOLIC BLOOD PRESSURE: 60 MMHG | SYSTOLIC BLOOD PRESSURE: 110 MMHG

## 2023-04-10 DIAGNOSIS — R42 DIZZINESS: ICD-10-CM

## 2023-04-10 DIAGNOSIS — N92.0 MENORRHAGIA WITH REGULAR CYCLE: Primary | ICD-10-CM

## 2023-04-10 DIAGNOSIS — N92.0 MENORRHAGIA WITH REGULAR CYCLE: ICD-10-CM

## 2023-04-10 DIAGNOSIS — R53.83 OTHER FATIGUE: ICD-10-CM

## 2023-04-10 DIAGNOSIS — Z13.1 SCREENING FOR DIABETES MELLITUS: ICD-10-CM

## 2023-04-10 DIAGNOSIS — Z13.6 SCREENING FOR ISCHEMIC HEART DISEASE: ICD-10-CM

## 2023-04-10 LAB
ALBUMIN SERPL BCG-MCNC: 4.5 G/DL (ref 3.5–5.2)
ALP SERPL-CCNC: 49 U/L (ref 35–104)
ALT SERPL W P-5'-P-CCNC: 14 U/L (ref 10–35)
ANION GAP SERPL CALCULATED.3IONS-SCNC: 13 MMOL/L (ref 7–15)
AST SERPL W P-5'-P-CCNC: 24 U/L (ref 10–35)
BILIRUB SERPL-MCNC: 0.4 MG/DL
BUN SERPL-MCNC: 11.8 MG/DL (ref 6–20)
CALCIUM SERPL-MCNC: 9.4 MG/DL (ref 8.6–10)
CHLORIDE SERPL-SCNC: 102 MMOL/L (ref 98–107)
CHOLEST SERPL-MCNC: 161 MG/DL
CREAT SERPL-MCNC: 0.81 MG/DL (ref 0.51–0.95)
DEPRECATED HCO3 PLAS-SCNC: 25 MMOL/L (ref 22–29)
ERYTHROCYTE [DISTWIDTH] IN BLOOD BY AUTOMATED COUNT: 12.5 % (ref 10–15)
GFR SERPL CREATININE-BSD FRML MDRD: 89 ML/MIN/1.73M2
GLUCOSE SERPL-MCNC: 89 MG/DL (ref 70–99)
HCT VFR BLD AUTO: 37.8 % (ref 35–47)
HDLC SERPL-MCNC: 90 MG/DL
HGB BLD-MCNC: 12.6 G/DL (ref 11.7–15.7)
LDLC SERPL CALC-MCNC: 65 MG/DL
MCH RBC QN AUTO: 30.9 PG (ref 26.5–33)
MCHC RBC AUTO-ENTMCNC: 33.3 G/DL (ref 31.5–36.5)
MCV RBC AUTO: 93 FL (ref 78–100)
NONHDLC SERPL-MCNC: 71 MG/DL
PLATELET # BLD AUTO: 266 10E3/UL (ref 150–450)
POTASSIUM SERPL-SCNC: 4.4 MMOL/L (ref 3.4–5.3)
PROT SERPL-MCNC: 7.3 G/DL (ref 6.4–8.3)
RBC # BLD AUTO: 4.08 10E6/UL (ref 3.8–5.2)
SODIUM SERPL-SCNC: 140 MMOL/L (ref 136–145)
TRIGL SERPL-MCNC: 30 MG/DL
WBC # BLD AUTO: 8 10E3/UL (ref 4–11)

## 2023-04-10 PROCEDURE — 36415 COLL VENOUS BLD VENIPUNCTURE: CPT | Performed by: NURSE PRACTITIONER

## 2023-04-10 PROCEDURE — 85027 COMPLETE CBC AUTOMATED: CPT | Performed by: NURSE PRACTITIONER

## 2023-04-10 PROCEDURE — 76830 TRANSVAGINAL US NON-OB: CPT | Performed by: OBSTETRICS & GYNECOLOGY

## 2023-04-10 PROCEDURE — 80053 COMPREHEN METABOLIC PANEL: CPT | Performed by: NURSE PRACTITIONER

## 2023-04-10 PROCEDURE — 80061 LIPID PANEL: CPT | Performed by: NURSE PRACTITIONER

## 2023-04-10 PROCEDURE — 99214 OFFICE O/P EST MOD 30 MIN: CPT | Performed by: NURSE PRACTITIONER

## 2023-04-10 NOTE — PROGRESS NOTES
SUBJECTIVE:                                                   Anusha Robins is a 47 year old female who presents to clinic today for the following health issue(s):  Patient presents with:  Ultrasound  Follow Up    HPI:  Patient presents today for US follow-up for increased nausea, cramping, and dizziness with menses and HMB with menses. LMP 23. Just finished her cycle yesterday. TVUS completed this morning. Overall appears to be normal. One small complex cyst noted on left ovary measuring 1.5 x 0.9 x 1.1cm. Endometrial lining measures 5.30mm in thickness. Patient endorses the dizziness has become more frequent, happening now everyday with positional changes such as getting out of bed. States she is drinking an adequate amount of fluids and eating throughout the day. Noticed the increase in dizziness after adjustment of her levothyroxine about 3 weeks ago for Hashimotos. Denies any recent cruises or being on a boat. No changes in hearing or tinnitus.      Patient's last menstrual period was 2023..     Patient is sexually active, .  Using none for contraception.    reports that she has quit smoking. She has never used smokeless tobacco.    STD testing offered?  Declined    Health maintenance updated:  yes    Today's PHQ-2 Score:       3/9/2023     2:57 PM   PHQ-2 (  [employer])   Q1: Little interest or pleasure in doing things 1   Q2: Feeling down, depressed or hopeless 1   PHQ-2 Score 2     Today's PHQ-9 Score:       3/9/2023     2:57 PM   PHQ-9 SCORE   PHQ-9 Total Score 5     Today's CASSANDRA-7 Score:       3/9/2023     2:57 PM   CASSANDRA-7 SCORE   Total Score 7       Problem list and histories reviewed & adjusted, as indicated.  Additional history: as documented.    Patient Active Problem List   Diagnosis     Hypothyroidism, unspecified type     Past Surgical History:   Procedure Laterality Date     COLONOSCOPY N/A 2022    Procedure: COLONOSCOPY;  Surgeon: Renny Forman MD;  Location:  GI  "    NO HISTORY OF SURGERY        Social History     Tobacco Use     Smoking status: Former     Smokeless tobacco: Never     Tobacco comments:     quit 1997   Vaping Use     Vaping status: Not on file   Substance Use Topics     Alcohol use: Yes     Alcohol/week: 0.0 standard drinks of alcohol      Problem (# of Occurrences) Relation (Name,Age of Onset)    Diabetes (2) Maternal Grandmother, Other: Aunt, unspecified    No Known Problems (7) Mother, Father, Sister, Brother, Maternal Grandfather, Paternal Grandmother, Paternal Grandfather            Current Outpatient Medications   Medication Sig     butalbital-acetaminophen-caffeine (ESGIC) -40 MG tablet Take 1-2 tablets by mouth every 6 hours as needed for headaches or migraine     Esomeprazole Magnesium (NEXIUM PO)      levothyroxine (TIROSINT) 112 MCG capsule Take 1 capsule by mouth daily     Multiple Vitamins-Minerals (MULTIVITAL PO)      Probiotic Product (PROBIOTIC PO)      VITAMIN D PO      No current facility-administered medications for this visit.     Allergies   Allergen Reactions     Sulfa Drugs Hives       ROS:  12 point review of systems negative other than symptoms noted below or in the HPI.  No urinary frequency or dysuria, bladder or kidney problems      OBJECTIVE:     /60   Pulse 68   Ht 1.651 m (5' 5\")   Wt 62.6 kg (138 lb)   LMP 04/06/2023   BMI 22.96 kg/m    Body mass index is 22.96 kg/m .    Exam:  Constitutional:  Appearance: Well nourished, well developed alert, in no acute distress  Neurologic:  Mental Status:  Oriented X3.  Normal strength and tone, sensory exam grossly normal, mentation intact and speech normal.    Psychiatric:  Mentation appears normal and affect normal/bright.     In-Clinic Test Results:  Results for orders placed or performed in visit on 04/10/23 (from the past 24 hour(s))   CBC with platelets   Result Value Ref Range    WBC Count 8.0 4.0 - 11.0 10e3/uL    RBC Count 4.08 3.80 - 5.20 10e6/uL    Hemoglobin " 12.6 11.7 - 15.7 g/dL    Hematocrit 37.8 35.0 - 47.0 %    MCV 93 78 - 100 fL    MCH 30.9 26.5 - 33.0 pg    MCHC 33.3 31.5 - 36.5 g/dL    RDW 12.5 10.0 - 15.0 %    Platelet Count 266 150 - 450 10e3/uL       ASSESSMENT/PLAN:                                                        ICD-10-CM    1. Heavy menstrual bleeding  N92.0       2. Other fatigue  R53.83 CBC with platelets     CBC with platelets      3. Screening for diabetes mellitus  Z13.1 Comprehensive metabolic panel      4. Screening for ischemic heart disease  Z13.6 Lipid Profile      5. Dizziness  R42             PLAN  Patient presents for US follow-up for dizziness, cramping and HMB with menses. TVUS overall appears to be normal. One small complex cyst noted on left ovary measuring 1.5 x 0.9 x 1.1cm. Endometrial lining measures 5.30mm in thickness. Discussed that the cyst is likely remnant of ovulation, as she just finished her cycle. At this point the cyst is small enough in size that we will plan to continue to monitor it. Since her dizziness is in relation to positional changes, likely orthostatic hypotension. She also noticed this more after her dosage change of levothyroxine. Recommended seeing endocrinology to further evaluate her symptoms with the dose change. Advised trying Meclozine patches to see if that would help with dizziness. Will draw a CBC today to evaluate hgb. Discussed taking NSAIDs such as Ibuprofen for cramping and HMB with menses. Start ibuprofen 2 days prior to onset of menses and continue for the first few days when the cramping and HMB are at their peak. Could also try Midol for cramping. If after seeing endocrine and trying the above measures she is still experiencing symptoms, would advise seeing one of the physicians for further evaluation.   RTC as concerns arise.    I, Liza Damon, completed the PFSH and ROS. I then acted as a scribe for ALICIA Rudolph CNP for the remainder of the visit.  Liza Damon  NAYLA, RN  [facility] [employer], [employer] student    I was present with the student who participated in the service and in the documentation of the note. I have verified the history and medical decision-making.  Student participated in the annual exam and I can attest to being personally present for the entire exam. I agree with the assessment and plan of care as documented in the note. ALICIA Rudolph CNP, APRN CNP  [facility]

## 2023-04-12 NOTE — RESULT ENCOUNTER NOTE
Anusha      Your  cholesterol, liver, kidney and glucose test results are normal.  If further questions please give me a call.    Jessi Lua RNC

## 2024-01-02 DIAGNOSIS — G43.011 INTRACTABLE MIGRAINE WITHOUT AURA AND WITH STATUS MIGRAINOSUS: ICD-10-CM

## 2024-01-02 RX ORDER — BUTALBITAL, ACETAMINOPHEN AND CAFFEINE 50; 325; 40 MG/1; MG/1; MG/1
1-2 TABLET ORAL EVERY 6 HOURS PRN
Qty: 20 TABLET | Refills: 0 | Status: SHIPPED | OUTPATIENT
Start: 2024-01-02

## 2024-01-02 NOTE — TELEPHONE ENCOUNTER
Last annual masood Lua NP 3/9/2023    Requesting a refill on Rx: butalbital-acetaminophen-caffeine (ESGIC) -40 MG tablet     Routing to Linda Marrufo NP - ok to approve?    Linda Pascal RN on 1/2/2024 at 8:46 AM

## 2024-04-15 ENCOUNTER — ANCILLARY PROCEDURE (OUTPATIENT)
Dept: MAMMOGRAPHY | Facility: CLINIC | Age: 49
End: 2024-04-15
Payer: COMMERCIAL

## 2024-04-15 DIAGNOSIS — Z12.31 VISIT FOR SCREENING MAMMOGRAM: ICD-10-CM

## 2024-04-15 PROCEDURE — 77063 BREAST TOMOSYNTHESIS BI: CPT | Mod: TC | Performed by: RADIOLOGY

## 2024-04-15 PROCEDURE — 77067 SCR MAMMO BI INCL CAD: CPT | Mod: TC | Performed by: RADIOLOGY

## 2024-06-02 ENCOUNTER — HEALTH MAINTENANCE LETTER (OUTPATIENT)
Age: 49
End: 2024-06-02

## 2025-06-15 ENCOUNTER — HEALTH MAINTENANCE LETTER (OUTPATIENT)
Age: 50
End: 2025-06-15

## (undated) DEVICE — SOL WATER IRRIG 1000ML BOTTLE 2F7114

## (undated) RX ORDER — FENTANYL CITRATE 50 UG/ML
INJECTION, SOLUTION INTRAMUSCULAR; INTRAVENOUS
Status: DISPENSED
Start: 2022-02-25

## (undated) RX ORDER — ONDANSETRON 2 MG/ML
INJECTION INTRAMUSCULAR; INTRAVENOUS
Status: DISPENSED
Start: 2022-02-25